# Patient Record
Sex: FEMALE | Race: WHITE | NOT HISPANIC OR LATINO | Employment: FULL TIME | URBAN - METROPOLITAN AREA
[De-identification: names, ages, dates, MRNs, and addresses within clinical notes are randomized per-mention and may not be internally consistent; named-entity substitution may affect disease eponyms.]

---

## 2018-04-04 ENCOUNTER — HOSPITAL ENCOUNTER (EMERGENCY)
Facility: HOSPITAL | Age: 58
Discharge: HOME | End: 2018-04-04
Attending: EMERGENCY MEDICINE | Admitting: EMERGENCY MEDICINE

## 2018-04-04 VITALS
SYSTOLIC BLOOD PRESSURE: 137 MMHG | TEMPERATURE: 98.1 F | WEIGHT: 140 LBS | HEART RATE: 90 BPM | OXYGEN SATURATION: 100 % | DIASTOLIC BLOOD PRESSURE: 83 MMHG | RESPIRATION RATE: 20 BRPM

## 2018-04-04 DIAGNOSIS — M06.9 RHEUMATOID ARTHRITIS FLARE (HCC): Primary | ICD-10-CM

## 2018-04-04 PROCEDURE — 99283 EMERGENCY DEPT VISIT LOW MDM: CPT

## 2018-04-04 RX ORDER — PREDNISONE 1 MG/1
TABLET ORAL
Qty: 21 TABLET | Refills: 0 | Status: SHIPPED | OUTPATIENT
Start: 2018-04-04 | End: 2019-02-26

## 2018-04-04 RX ADMIN — PREDNISONE 50 MG: 20 TABLET ORAL at 13:27

## 2018-04-04 NOTE — ED PROVIDER NOTES
History  Chief Complaint   Patient presents with    Hand Swelling     Pt c/o worsening hand swelling x 2 months  Pt states she was diagnosed RA in knees "years ago"     62-year-old female with past medical history significant for rheumatoid arthritis, aortic aneurysm and ectopic pregnancy presents to the emergency department with chief complaint of bilateral hand swelling  Onset of symptoms reported as 2 months ago  Location of symptoms reported as bilateral hands  Quality is reported as localized swelling and aching pain  Severity is reported as severe  Associated symptoms:  Denies paralysis paresthesias or weaknesses to the upper extremities  Positive for joint swelling  Denies foot pain or shortness of breath  Modifiers: Movement exacerbates pain  Context: denies recent fall, injury or trauma to the area  Patient reports she was diagnosed with rheumatoid arthritis years ago  She reports over the past 2 months she reports regular swelling of the small joints of the bilateral hands  She reports localized redness to the joints of the bilateral hands in addition to swelling  Medical summary:  Review of past visits via [employer] demonstrates no prior visits to this emergency department  History provided by:  Patient   used: No        None       Past Medical History:   Diagnosis Date    Aortic aneurysm ([facility] )     Ectopic pregnancy     RA (rheumatoid arthritis) ([facility] )        History reviewed  No pertinent surgical history  History reviewed  No pertinent family history  I have reviewed and agree with the history as documented  Social History   Substance Use Topics    Smoking status: Never Smoker    Smokeless tobacco: Never Used    Alcohol use No        Review of Systems   Constitutional: Negative for activity change, appetite change, chills, diaphoresis, fatigue and fever     HENT: Negative for congestion, dental problem, drooling, ear discharge, ear pain, facial swelling, hearing loss, mouth sores, nosebleeds, postnasal drip, rhinorrhea, sinus pain, sinus pressure, sneezing, sore throat, tinnitus, trouble swallowing and voice change  Eyes: Negative for photophobia, pain, discharge, redness and itching  Respiratory: Negative for cough, chest tightness, shortness of breath and wheezing  Cardiovascular: Negative for chest pain, palpitations and leg swelling  Gastrointestinal: Negative for abdominal pain, constipation, diarrhea, nausea and vomiting  Endocrine: Negative for cold intolerance, heat intolerance, polydipsia, polyphagia and polyuria  Genitourinary: Negative for decreased urine volume, difficulty urinating, dysuria, flank pain, frequency, hematuria and urgency  Musculoskeletal: Positive for arthralgias and joint swelling  Negative for back pain, gait problem, myalgias, neck pain and neck stiffness  Skin: Positive for rash  Negative for color change, pallor and wound  Allergic/Immunologic: Negative for environmental allergies, food allergies and immunocompromised state  Neurological: Negative for dizziness, tremors, seizures, syncope, facial asymmetry, speech difficulty, weakness, light-headedness, numbness and headaches  Hematological: Negative for adenopathy  Does not bruise/bleed easily  Psychiatric/Behavioral: Negative for agitation, confusion, decreased concentration and hallucinations  The patient is not nervous/anxious  All other systems reviewed and are negative        Physical Exam  ED Triage Vitals [04/04/18 1240]   Temperature Pulse Respirations Blood Pressure SpO2   98 1 °F (36 7 °C) 90 20 137/83 100 %      Temp Source Heart Rate Source Patient Position - Orthostatic VS BP Location FiO2 (%)   Oral Monitor Sitting Left arm --      Pain Score       Worst Possible Pain           Orthostatic Vital Signs  Vitals:    04/04/18 1240   BP: 137/83   Pulse: 90   Patient Position - Orthostatic VS: Sitting       Physical Exam   Constitutional: She is oriented to person, place, and time  She appears well-developed and well-nourished  No distress  /83 (BP Location: Left arm)   Pulse 90   Temp 98 1 °F (36 7 °C) (Oral)   Resp 20   Wt 63 5 kg (140 lb)   SpO2 100%    HENT:   Head: Normocephalic and atraumatic  Right Ear: External ear normal    Left Ear: External ear normal    Nose: Nose normal    Mouth/Throat: Oropharynx is clear and moist  No oropharyngeal exudate  Eyes: Conjunctivae and EOM are normal  Pupils are equal, round, and reactive to light  Right eye exhibits no discharge  Left eye exhibits no discharge  No scleral icterus  Neck: Normal range of motion  Neck supple  No JVD present  No tracheal deviation present  Cardiovascular: Normal rate, regular rhythm and intact distal pulses  Pulmonary/Chest: Effort normal and breath sounds normal  No respiratory distress  She has no wheezes  She has no rales  She exhibits no tenderness  Abdominal: Soft  Bowel sounds are normal  She exhibits no distension and no mass  There is no tenderness  There is no rebound and no guarding  No hernia  Musculoskeletal: Normal range of motion  She exhibits edema and tenderness  She exhibits no deformity  There is redness and swelling present to the MCPJ and PIPJ to all fingers of bilateral hands  Additional swelling to bilateral wrists  Strength 5/5 normal   Cap refill less than 3 seconds  Flex/ext tendon function fully intact to all fingers  Radial pulse 2/4 normal bilaterally  Lymphadenopathy:     She has no cervical adenopathy  Neurological: She is alert and oriented to person, place, and time  She displays normal reflexes  No cranial nerve deficit or sensory deficit  She exhibits normal muscle tone  Coordination normal    Skin: Skin is warm and dry  Capillary refill takes less than 2 seconds  No rash noted  She is not diaphoretic  No erythema  No pallor  Psychiatric: She has a normal mood and affect   Her behavior is normal  Judgment and thought content normal    Nursing note and vitals reviewed  ED Medications  Medications   predniSONE tablet 50 mg (50 mg Oral Given 4/4/18 1327)       Diagnostic Studies  Results Reviewed     None                 No orders to display              Procedures  Procedures       Phone Contacts  ED Phone Contact    ED Course  ED Course                                MDM  Number of Diagnoses or Management Options  Rheumatoid arthritis flare ([facility] ): new and does not require workup  Diagnosis management comments: ddx includes but is not limited to fracture, contusion, sprain, strain, nerve injury, tendon injury, vascular injury, tendinitis, bursitis, dislocation, plan xray to rule out fracture or dislocation  Long discussion with patient this  Discussed will treat with a course of prednisone to alleviate symptoms and for patient Orthopedics or Rheumatology for further evaluation of symptoms  Reviewed reasons to return to ed  Patient verbalized understanding of diagnosis and agreement with discharge plan of care as well as understanding of reasons to return to ed  Amount and/or Complexity of Data Reviewed  Obtain history from someone other than the patient: yes (Family member at bedside)  Review and summarize past medical records: yes    Patient Progress  Patient progress: stable    CritCare Time    Disposition  Final diagnoses:   Rheumatoid arthritis flare ([facility] )     Time reflects when diagnosis was documented in both MDM as applicable and the Disposition within this note     Time User Action Codes Description Comment    4/4/2018  1:19 PM Karlene Skiff Add [M06 9] Rheumatoid arthritis flare [facility])       ED Disposition     ED Disposition Condition Comment    Discharge  [facility] discharge to home/self care      Condition at discharge: Stable        Follow-up Information     Follow up With Specialties Details Why Mckayla Dumont MD Orthopedic Surgery Call in 1 day for further evaluation of symptoms [address]  [facility]      Chang Crowley MD Family Medicine Call in 1 day for further evaluation of symptoms [address] [facility]  [phone]          Discharge Medication List as of 4/4/2018  1:21 PM      START taking these medications    Details   predniSONE 5 mg tablet 40 mg PO day 1, then 30 mg PO day 2, 20 mg PO day 3, 10 mg PO day 4, 5 mg PO day 5 then stop, Print           No discharge procedures on file      ED Provider  Electronically Signed by           Arianne Mattson PA-C  04/04/18 5188

## 2018-04-04 NOTE — DISCHARGE INSTRUCTIONS
Rheumatoid Arthritis   AMBULATORY CARE:   Rheumatoid arthritis  is a long-term autoimmune disease that causes inflammation and damage to your joints  RA causes your body's immune system to attack the synovial membrane (lining) in your joints  RA can also affect other organs, such as your eyes, heart, or lungs  It may also increase your risk of osteoporosis (weakened bones)  Common symptoms include the following:   · Joint pain and stiffness that lasts longer than 1 hour    · Swollen joints in the same joint on both sides of your body    · Loss of joint movement     · Firm, round nodules (growths) on your joints    · Fatigue or muscle weakness    · Loss of appetite or weight loss  Seek care immediately if:   · You have increased joint swelling, pain, or redness  · You have sudden shortness of breath  · You lose feeling in your hands or feet  Contact your healthcare provider or rheumatologist if:   · You have a fever  · Your skin is itchy, swollen, or has a rash  · Your symptoms are getting worse, even with treatment  · You have questions or concerns about your condition or care  Treatment for rheumatoid arthritis  may include any of the following:  · Antirheumatics  help slow the progress of RA, and reduce pain, stiffness, and inflammation  · NSAIDs , such as ibuprofen, help decrease swelling, pain, and fever  This medicine is available with or without a doctor's order  NSAIDs can cause stomach bleeding or kidney problems in certain people  If you take blood thinner medicine, always ask your healthcare provider if NSAIDs are safe for you  Always read the medicine label and follow directions  · Steroids  help decrease inflammation  · Biologic therapy  helps decrease joint swelling, pain, and stiffness  These medicines increase the risk of serious infection and require careful monitoring  · Take your medicine as directed    Contact your healthcare provider if you think your medicine is not helping or if you have side effects  Tell him of her if you are allergic to any medicine  Keep a list of the medicines, vitamins, and herbs you take  Include the amounts, and when and why you take them  Bring the list or the pill bottles to follow-up visits  Carry your medicine list with you in case of an emergency  · Surgery  may be needed to remove all or part of your joint  An implant may be placed to help reduce pain and repair the joint  Manage your symptoms:   · Go to physical and occupational therapy as directed  A physical therapist can teach you exercises to help improve movement and strength, and to decrease pain  An occupational therapist can teach you skills to help with your daily activities  · Use support devices  You may be given splints or braces to help your joints rest and to decrease inflammation  · Rest when your joints are painful  Limit your activities until your symptoms improve  Gradually start your normal activities when you can do them without pain  Avoid motions and activities that cause strain on your joints, such as heavy exercise and lifting  · Apply ice or heat  Both can help decrease swelling and pain  Use an ice pack, or put crushed ice in a plastic bag  Cover it with a towel and place it on your joint for 15 to 20 minutes every hour or as directed  You can apply heat for 20 minutes every 2 hours  Heat treatment includes hot packs or a warm compress  · Maintain a healthy weight  to decrease the strain on joints in your back, knees, ankles, and feet  Ask your healthcare provider how much you should weigh  Ask him to help you create a weight loss plan if you are overweight  · Physical activity  can help increase strength and flexibility  Be as active as possible while avoiding things that increase your pain  Ask your healthcare provider or rheumatologist about the best exercise plan for you  Ask your healthcare provider about vaccines:   You may be at an increased risk for infections due to RA and its treatment  Vaccines may prevent infections from various viruses  Follow up with your healthcare provider as directed:  Write down your questions so you remember to ask them during your visits  © 2017 [employer] Information is for End User's use only and may not be sold, redistributed or otherwise used for commercial purposes  All illustrations and images included in CareNotes® are the copyrighted property of [employer]  or [employer]  The above information is an  only  It is not intended as medical advice for individual conditions or treatments  Talk to your doctor, nurse or pharmacist before following any medical regimen to see if it is safe and effective for you

## 2018-04-30 ENCOUNTER — HOSPITAL ENCOUNTER (EMERGENCY)
Facility: HOSPITAL | Age: 58
Discharge: HOME | End: 2018-05-01
Attending: EMERGENCY MEDICINE

## 2018-04-30 ENCOUNTER — APPOINTMENT (EMERGENCY)
Dept: ULTRASOUND IMAGING | Facility: HOSPITAL | Age: 58
End: 2018-04-30

## 2018-04-30 VITALS
WEIGHT: 140 LBS | OXYGEN SATURATION: 99 % | BODY MASS INDEX: 22.5 KG/M2 | SYSTOLIC BLOOD PRESSURE: 120 MMHG | HEART RATE: 98 BPM | TEMPERATURE: 100.3 F | RESPIRATION RATE: 16 BRPM | HEIGHT: 66 IN | DIASTOLIC BLOOD PRESSURE: 74 MMHG

## 2018-04-30 DIAGNOSIS — I82.890 SUPERFICIAL VEIN THROMBOSIS: Primary | ICD-10-CM

## 2018-04-30 LAB
ANION GAP SERPL CALCULATED.3IONS-SCNC: 9 MMOL/L (ref 4–13)
APTT PPP: 33 SECONDS (ref 23–35)
BACTERIA UR QL AUTO: NORMAL /HPF
BASOPHILS # BLD AUTO: 0.02 THOUSANDS/ΜL (ref 0–0.1)
BASOPHILS NFR BLD AUTO: 0 % (ref 0–1)
BILIRUB UR QL STRIP: NEGATIVE
BUN SERPL-MCNC: 16 MG/DL (ref 5–25)
CALCIUM SERPL-MCNC: 9.1 MG/DL (ref 8.3–10.1)
CHLORIDE SERPL-SCNC: 100 MMOL/L (ref 100–108)
CLARITY UR: CLEAR
CO2 SERPL-SCNC: 27 MMOL/L (ref 21–32)
COLOR UR: YELLOW
CREAT SERPL-MCNC: 0.67 MG/DL (ref 0.6–1.3)
EOSINOPHIL # BLD AUTO: 0.04 THOUSAND/ΜL (ref 0–0.61)
EOSINOPHIL NFR BLD AUTO: 1 % (ref 0–6)
ERYTHROCYTE [DISTWIDTH] IN BLOOD BY AUTOMATED COUNT: 15.7 % (ref 11.6–15.1)
GFR SERPL CREATININE-BSD FRML MDRD: 99 ML/MIN/1.73SQ M
GLUCOSE SERPL-MCNC: 98 MG/DL (ref 65–140)
GLUCOSE UR STRIP-MCNC: NEGATIVE MG/DL
HCT VFR BLD AUTO: 34.7 % (ref 34.8–46.1)
HGB BLD-MCNC: 11.2 G/DL (ref 11.5–15.4)
HGB UR QL STRIP.AUTO: NEGATIVE
INR PPP: 1.05 (ref 0.86–1.16)
KETONES UR STRIP-MCNC: NEGATIVE MG/DL
LACTATE SERPL-SCNC: 0.7 MMOL/L (ref 0.5–2)
LEUKOCYTE ESTERASE UR QL STRIP: NEGATIVE
LYMPHOCYTES # BLD AUTO: 1.28 THOUSANDS/ΜL (ref 0.6–4.47)
LYMPHOCYTES NFR BLD AUTO: 17 % (ref 14–44)
MCH RBC QN AUTO: 25.5 PG (ref 26.8–34.3)
MCHC RBC AUTO-ENTMCNC: 32.3 G/DL (ref 31.4–37.4)
MCV RBC AUTO: 79 FL (ref 82–98)
MONOCYTES # BLD AUTO: 0.73 THOUSAND/ΜL (ref 0.17–1.22)
MONOCYTES NFR BLD AUTO: 10 % (ref 4–12)
NEUTROPHILS # BLD AUTO: 5.5 THOUSANDS/ΜL (ref 1.85–7.62)
NEUTS SEG NFR BLD AUTO: 72 % (ref 43–75)
NITRITE UR QL STRIP: NEGATIVE
NON-SQ EPI CELLS URNS QL MICRO: NORMAL /HPF
NRBC BLD AUTO-RTO: 0 /100 WBCS
PH UR STRIP.AUTO: 6 [PH] (ref 4.5–8)
PLATELET # BLD AUTO: 221 THOUSANDS/UL (ref 149–390)
PMV BLD AUTO: 9.7 FL (ref 8.9–12.7)
POTASSIUM SERPL-SCNC: 3.3 MMOL/L (ref 3.5–5.3)
PROT UR STRIP-MCNC: ABNORMAL MG/DL
PROTHROMBIN TIME: 13.9 SECONDS (ref 12.1–14.4)
RBC # BLD AUTO: 4.39 MILLION/UL (ref 3.81–5.12)
RBC #/AREA URNS AUTO: NORMAL /HPF
SODIUM SERPL-SCNC: 136 MMOL/L (ref 136–145)
SP GR UR STRIP.AUTO: 1.02 (ref 1–1.03)
TROPONIN I SERPL-MCNC: <0.02 NG/ML
UROBILINOGEN UR QL STRIP.AUTO: 0.2 E.U./DL
WBC # BLD AUTO: 7.6 THOUSAND/UL (ref 4.31–10.16)
WBC #/AREA URNS AUTO: NORMAL /HPF

## 2018-04-30 PROCEDURE — 36415 COLL VENOUS BLD VENIPUNCTURE: CPT | Performed by: PHYSICIAN ASSISTANT

## 2018-04-30 PROCEDURE — 96361 HYDRATE IV INFUSION ADD-ON: CPT

## 2018-04-30 PROCEDURE — 93970 EXTREMITY STUDY: CPT | Performed by: SURGERY

## 2018-04-30 PROCEDURE — 87040 BLOOD CULTURE FOR BACTERIA: CPT | Performed by: PHYSICIAN ASSISTANT

## 2018-04-30 PROCEDURE — 96365 THER/PROPH/DIAG IV INF INIT: CPT

## 2018-04-30 PROCEDURE — 85730 THROMBOPLASTIN TIME PARTIAL: CPT | Performed by: PHYSICIAN ASSISTANT

## 2018-04-30 PROCEDURE — 84484 ASSAY OF TROPONIN QUANT: CPT | Performed by: PHYSICIAN ASSISTANT

## 2018-04-30 PROCEDURE — 85610 PROTHROMBIN TIME: CPT | Performed by: PHYSICIAN ASSISTANT

## 2018-04-30 PROCEDURE — 83605 ASSAY OF LACTIC ACID: CPT | Performed by: PHYSICIAN ASSISTANT

## 2018-04-30 PROCEDURE — 85025 COMPLETE CBC W/AUTO DIFF WBC: CPT | Performed by: PHYSICIAN ASSISTANT

## 2018-04-30 PROCEDURE — 93005 ELECTROCARDIOGRAM TRACING: CPT

## 2018-04-30 PROCEDURE — 80048 BASIC METABOLIC PNL TOTAL CA: CPT | Performed by: PHYSICIAN ASSISTANT

## 2018-04-30 PROCEDURE — 81001 URINALYSIS AUTO W/SCOPE: CPT | Performed by: PHYSICIAN ASSISTANT

## 2018-04-30 PROCEDURE — 93970 EXTREMITY STUDY: CPT

## 2018-04-30 RX ORDER — CLINDAMYCIN PHOSPHATE 600 MG/50ML
600 INJECTION INTRAVENOUS ONCE
Status: COMPLETED | OUTPATIENT
Start: 2018-04-30 | End: 2018-04-30

## 2018-04-30 RX ORDER — ACETAMINOPHEN 325 MG/1
650 TABLET ORAL ONCE
Status: COMPLETED | OUTPATIENT
Start: 2018-04-30 | End: 2018-04-30

## 2018-04-30 RX ADMIN — ACETAMINOPHEN 650 MG: 325 TABLET, FILM COATED ORAL at 22:07

## 2018-04-30 RX ADMIN — SODIUM CHLORIDE 1000 ML: 0.9 INJECTION, SOLUTION INTRAVENOUS at 22:05

## 2018-04-30 RX ADMIN — CLINDAMYCIN PHOSPHATE 600 MG: 12 INJECTION, SOLUTION INTRAMUSCULAR; INTRAVENOUS at 22:07

## 2018-04-30 NOTE — Clinical Note
Case was discussed with Abhishek Tobias PA-C and the patient's admission status was agreed to be Admission Status: observation status to the service of Dr Suzie Hernandez

## 2018-05-01 LAB
ATRIAL RATE: 104 BPM
P AXIS: 70 DEGREES
PR INTERVAL: 154 MS
QRS AXIS: 0 DEGREES
QRSD INTERVAL: 92 MS
QT INTERVAL: 346 MS
QTC INTERVAL: 454 MS
T WAVE AXIS: 43 DEGREES
VENTRICULAR RATE: 104 BPM

## 2018-05-01 PROCEDURE — 93010 ELECTROCARDIOGRAM REPORT: CPT | Performed by: INTERNAL MEDICINE

## 2018-05-01 PROCEDURE — 99284 EMERGENCY DEPT VISIT MOD MDM: CPT

## 2018-05-01 RX ADMIN — RIVAROXABAN 10 MG: 10 TABLET, FILM COATED ORAL at 00:02

## 2018-05-01 NOTE — DISCHARGE INSTRUCTIONS
Take xarelto 20 mg tablets - take 1/2 tablet by mouth daily x 45 days  Superficial Thrombophlebitis   WHAT YOU NEED TO KNOW:   Superficial thrombophlebitis (STP) is inflammation of a vein just under your skin (superficial vein)  The inflammation causes a blood clot to form in your vein  STP most often happens in your leg but may also happen in your arm  DISCHARGE INSTRUCTIONS:   Call 911 for any of the following:   · You feel lightheaded, short of breath, and have chest pain  · You cough up blood  Return to the emergency department if:   · Your leg or arm turns pale or blue  · Your leg or arm feels hot or cold  · Your arm or leg feels warm, tender, and painful  It may look swollen and red  Contact your healthcare provider or hematologist if:   · Your symptoms return after treatment  · You have questions or concerns about your condition or care  Medicines: You may  need any of the following:  · Antibiotics  may be given to treat a bacterial infection  · NSAIDs , such as ibuprofen, help decrease swelling, pain, and fever  NSAIDs can cause stomach bleeding or kidney problems in certain people  If you take blood thinner medicine, always ask your healthcare provider if NSAIDs are safe for you  Always read the medicine label and follow directions  · Blood thinners    help prevent blood clots  Examples of blood thinners include heparin and warfarin  Clots can cause strokes, heart attacks, and death  The following are general safety guidelines to follow while you are taking a blood thinner:    ¨ Watch for bleeding and bruising while you take blood thinners  Watch for bleeding from your gums or nose  Watch for blood in your urine and bowel movements  Use a soft washcloth on your skin, and a soft toothbrush to brush your teeth  This can keep your skin and gums from bleeding  If you shave, use an electric shaver  Do not play contact sports       ¨ Tell your dentist and other healthcare providers that you take anticoagulants  Wear a bracelet or necklace that says you take this medicine  ¨ Do not start or stop any medicines unless your healthcare provider tells you to  Many medicines cannot be used with blood thinners  ¨ Tell your healthcare provider right away if you forget to take the medicine, or if you take too much  ¨ Warfarin  is a blood thinner that you may need to take  The following are things you should be aware of if you take warfarin  § Foods and medicines can affect the amount of warfarin in your blood  Do not make major changes to your diet while you take warfarin  Warfarin works best when you eat about the same amount of vitamin K every day  Vitamin K is found in green leafy vegetables and certain other foods  Ask for more information about what to eat when you are taking warfarin  § You will need to see your healthcare provider for follow-up visits when you are on warfarin  You will need regular blood tests  These tests are used to decide how much medicine you need  · Antiplatelets , such as aspirin, help prevent blood clots  Take your antiplatelet medicine exactly as directed  These medicines make it more likely for you to bleed or bruise  If you are told to take aspirin, do not take acetaminophen or ibuprofen instead  · Take your medicine as directed  Contact your healthcare provider if you think your medicine is not helping or if you have side effects  Tell him of her if you are allergic to any medicine  Keep a list of the medicines, vitamins, and herbs you take  Include the amounts, and when and why you take them  Bring the list or the pill bottles to follow-up visits  Carry your medicine list with you in case of an emergency  Follow up with your healthcare provider as directed:  Write down your questions so you remember to ask them during your visits    Manage your symptoms and prevent STP:  STP can increase your risk for a blood clot in deeper veins in your arms or legs  It can also increase your risk for a blood clot in your lungs  Do the following to decrease your risk for more blood clots and manage your symptoms:  · Wear pressure stockings as directed  Pressure stockings improve blood flow and help prevent clots in your legs  Wear the stockings during the day  Do not wear them when you sleep  · Elevate your leg or arm above the level of your heart as often as you can  This will help decrease swelling and pain  Prop your leg or arm on pillows or blankets to keep it elevated comfortably  · Apply a warm compress to your arm or leg  This will help decrease swelling and pain  Wet a washcloth in warm water  Do not  use hot water  Apply the warm compress for 10 minutes  Repeat this 4 times each day  · Maintain a healthy weight  This will help decrease your risk for another blood clot  Ask your healthcare provider how much you should weigh  Ask him or her to help you create a weight loss plan if you are overweight  · Do not smoke  Nicotine and other chemicals in cigarettes and cigars can damage blood vessels and increase your risk for blood clots  Ask your healthcare provider for information if you currently smoke and need help to quit  E-cigarettes or smokeless tobacco still contain nicotine  Talk to your healthcare provider before you use these products  · Stay active  Activity helps prevent blood clots  Do not sit for more than an hour  If you travel by car or work at a desk, move and stretch in your seat several times each hour  In an airplane, get up and walk every hour  Exercise your legs while you are sitting by raising and lowering your heels  Keep your toes on the floor while you do this  You can also raise and lower your toes while keeping your heels on the floor  Also tighten and release your leg muscles while you are sitting  · Exercise regularly  Exercise can help increase your blood flow and prevent a blood clot  Walking is a good low-impact exercise  Talk to your healthcare provider about the best exercise plan for you  · Do not inject illegal drugs  Talk to your healthcare provider if you use IV drugs and need help to quit  © 2017 [employer] Information is for End User's use only and may not be sold, redistributed or otherwise used for commercial purposes  All illustrations and images included in CareNotes® are the copyrighted property of [employer] M , Inc  or [address]  The above information is an  only  It is not intended as medical advice for individual conditions or treatments  Talk to your doctor, nurse or pharmacist before following any medical regimen to see if it is safe and effective for you

## 2018-05-01 NOTE — ED PROVIDER NOTES
History  Chief Complaint   Patient presents with    Leg Swelling     Pt c/o redness, swelling and warmth on right side of leg that began as a single lump and now three lumps      60-year-old female with past medical history significant for aortic aneurysm, rheumatoid arthritis presents to the emergency department with chief complaint of right leg redness and swelling  Onset of symptoms reported as 3 days ago  Location of symptoms reported as the right thigh  Quality is reported as localized redness swelling and warmth  Severity is reported as moderate  Associated symptoms:  Positive for fevers  Positive for rash  Positive for leg pain  Denies paralysis paresthesias or weaknesses to the lower extremities  Denies abdominal pain  Denies chest pain  Modifying factors:  Patient reports nothing has been tried for treatment of symptoms  Context:  Patient reports she initially noticed redness to the right lower thigh 3 days ago  She reports yesterday she noticed redness and swelling approximately senior care up the right thigh  Today she noticed that the redness spread all the way to her groin and became concerned  She denies any recent fall injury or trauma to the area  She reports she does have a 4 6 centimeter aortic aneurysm which is monitored regularly with ultrasound  She reports recently she has been struggling with obtaining medical assistance in order to continue getting her yearly ultrasounds for her aortic aneurysm  Reviewed past visits via [facility] - patient was last seen in ED on 2018 for evaluation and treatment of rheumatoid arthritis  History provided by:  Patient   used: No        Prior to Admission Medications   Prescriptions Last Dose Informant Patient Reported?  Taking?   predniSONE 5 mg tablet   No No   Si mg PO day 1, then 30 mg PO day 2, 20 mg PO day 3, 10 mg PO day 4, 5 mg PO day 5 then stop      Facility-Administered Medications: None       Past Medical History:   Diagnosis Date    Aortic aneurysm (HCC)     Ectopic pregnancy     RA (rheumatoid arthritis) ([facility] [address] )        History reviewed  No pertinent surgical history  History reviewed  No pertinent family history  I have reviewed and agree with the history as documented  Social History   Substance Use Topics    Smoking status: Never Smoker    Smokeless tobacco: Never Used    Alcohol use No        Review of Systems   Constitutional: Positive for fever  Negative for activity change, appetite change, chills, diaphoresis and fatigue  HENT: Negative for congestion, dental problem, drooling, ear discharge, ear pain, facial swelling, hearing loss, mouth sores, nosebleeds, postnasal drip, rhinorrhea, sinus pain, sinus pressure, sneezing, sore throat, tinnitus, trouble swallowing and voice change  Eyes: Negative for photophobia, pain, discharge, redness and itching  Respiratory: Negative for cough, chest tightness, shortness of breath and wheezing  Cardiovascular: Positive for leg swelling  Negative for chest pain and palpitations  Gastrointestinal: Negative for abdominal distention, abdominal pain, anal bleeding, blood in stool, constipation, diarrhea, nausea, rectal pain and vomiting  Endocrine: Negative for cold intolerance, heat intolerance, polydipsia, polyphagia and polyuria  Genitourinary: Negative for decreased urine volume, difficulty urinating, dysuria, flank pain, frequency, hematuria and urgency  Musculoskeletal: Positive for arthralgias  Negative for back pain, gait problem, joint swelling, myalgias, neck pain and neck stiffness  Skin: Positive for rash  Negative for color change, pallor and wound  Allergic/Immunologic: Negative for environmental allergies, food allergies and immunocompromised state  Neurological: Negative for dizziness, tremors, seizures, syncope, facial asymmetry, speech difficulty, weakness, light-headedness, numbness and headaches     Hematological: Negative for adenopathy  Does not bruise/bleed easily  Psychiatric/Behavioral: Negative for agitation, confusion, decreased concentration and hallucinations  The patient is not nervous/anxious  All other systems reviewed and are negative  Physical Exam  ED Triage Vitals   Temperature Pulse Respirations Blood Pressure SpO2   04/30/18 2040 04/30/18 2040 04/30/18 2040 04/30/18 2040 04/30/18 2040   100 3 °F (37 9 °C) (!) 112 18 (!) 190/84 99 %      Temp Source Heart Rate Source Patient Position - Orthostatic VS BP Location FiO2 (%)   04/30/18 2040 04/30/18 2245 04/30/18 2040 04/30/18 2040 --   Oral Monitor Sitting Left arm       Pain Score       04/30/18 2040       7           Orthostatic Vital Signs  Vitals:    04/30/18 2040 04/30/18 2245   BP: (!) 190/84 120/74   Pulse: (!) 112 98   Patient Position - Orthostatic VS: Sitting Lying       Physical Exam   Constitutional: She is oriented to person, place, and time  She appears well-developed and well-nourished  No distress  /84 (BP Location: Left arm)   Pulse 112   Temp 100 3 °F (37 9 °C) (Oral)   Resp 16   Ht 5' 6" (1 676 m)   Wt 63 5 kg (140 lb)   SpO2 99%   BMI 22 60 kg/m²    HENT:   Head: Normocephalic and atraumatic  Right Ear: External ear normal    Left Ear: External ear normal    Nose: Nose normal    Mouth/Throat: Oropharynx is clear and moist  No oropharyngeal exudate  Eyes: Conjunctivae and EOM are normal  Pupils are equal, round, and reactive to light  Right eye exhibits no discharge  Left eye exhibits no discharge  No scleral icterus  Neck: Normal range of motion  Neck supple  No JVD present  No tracheal deviation present  Cardiovascular: Normal rate, regular rhythm and intact distal pulses  Pulmonary/Chest: Effort normal and breath sounds normal  No respiratory distress  She has no wheezes  She exhibits no tenderness  Abdominal: Soft  Bowel sounds are normal  She exhibits no distension and no mass  There is no tenderness   There is no rebound and no guarding  No hernia  Musculoskeletal: Normal range of motion  She exhibits edema and tenderness  She exhibits no deformity  Right upper leg: She exhibits tenderness, swelling and edema  Legs:  There are multiple varicose veins present to bilateral lower extremities  There is an area of erythema present from medial right knee that stretches in a wide band from the medial knee up the medial thigh toward the inguinal area  Area is well demarcated/warm to touch  Lymphadenopathy:     She has no cervical adenopathy  Neurological: She is alert and oriented to person, place, and time  She displays normal reflexes  No cranial nerve deficit or sensory deficit  She exhibits normal muscle tone  Coordination normal    Skin: Skin is warm and dry  Capillary refill takes less than 2 seconds  No rash noted  She is not diaphoretic  There is erythema  No pallor  Psychiatric: She has a normal mood and affect  Her behavior is normal  Judgment and thought content normal    Nursing note and vitals reviewed  ED Medications  Medications   sodium chloride 0 9 % bolus 1,000 mL (0 mL Intravenous Stopped 4/30/18 2317)   clindamycin (CLEOCIN) IVPB (premix) 600 mg (0 mg Intravenous Stopped 4/30/18 2236)   acetaminophen (TYLENOL) tablet 650 mg (650 mg Oral Given 4/30/18 2207)   rivaroxaban (XARELTO) tablet 10 mg (10 mg Oral Given 5/1/18 0002)       Diagnostic Studies  Results Reviewed     Procedure Component Value Units Date/Time    Lactic acid, plasma [[id number]]  (Normal) Collected:  04/30/18 2201    Lab Status:  Final result Specimen:  Blood from Arm, Right Updated:  04/30/18 2228     LACTIC ACID 0 7 mmol/L     Narrative:         Result may be elevated if tourniquet was used during collection      Troponin I [[id number]]  (Normal) Collected:  04/30/18 2201    Lab Status:  Final result Specimen:  Blood from Arm, Right Updated:  04/30/18 2227     Troponin I <0 02 ng/mL     Narrative:         Nu-B-2B Chemistry analyzer 99% cutoff is > 0 04 ng/mL in network labs    o cTnI 99% cutoff is useful only when applied to patients in the clinical setting of myocardial ischemia  o cTnI 99% cutoff should be interpreted in the context of clinical history, ECG findings and possibly cardiac imaging to establish correct diagnosis  o cTnI 99% cutoff may be suggestive but clearly not indicative of a coronary event without the clinical setting of myocardial ischemia  Urine Microscopic [[id number]]  (Normal) Collected:  04/30/18 2200    Lab Status:  Final result Specimen:  Urine from Urine, Clean Catch Updated:  04/30/18 2220     RBC, UA None Seen /hpf      WBC, UA None Seen /hpf      Epithelial Cells Occasional /hpf      Bacteria, UA Occasional /hpf     Basic metabolic panel [[id number]]  (Abnormal) Collected:  04/30/18 2201    Lab Status:  Final result Specimen:  Blood from Arm, Right Updated:  04/30/18 2220     Sodium 136 mmol/L      Potassium 3 3 (L) mmol/L      Chloride 100 mmol/L      CO2 27 mmol/L      Anion Gap 9 mmol/L      BUN 16 mg/dL      Creatinine 0 67 mg/dL      Glucose 98 mg/dL      Calcium 9 1 mg/dL      eGFR 99 ml/min/1 73sq m     Narrative:         National Kidney Disease Education Program recommendations are as follows:  GFR calculation is accurate only with a steady state creatinine  Chronic Kidney disease less than 60 ml/min/1 73 sq  meters  Kidney failure less than 15 ml/min/1 73 sq  meters      Protime-INR [[id number]]  (Normal) Collected:  04/30/18 2201    Lab Status:  Final result Specimen:  Blood from Arm, Right Updated:  04/30/18 2219     Protime 13 9 seconds      INR 1 05    APTT [[id number]]  (Normal) Collected:  04/30/18 2201    Lab Status:  Final result Specimen:  Blood from Arm, Right Updated:  04/30/18 2219     PTT 33 seconds     UA w Reflex to Microscopic w Reflex to Culture [[id number]]  (Abnormal) Collected:  04/30/18 2200    Lab Status:  Final result Specimen:  Urine from Urine, Clean Catch Updated: 04/30/18 2208     Color, UA Yellow     Clarity, UA Clear     Specific [place], UA 1 020     pH, UA 6 0     Leukocytes, UA Negative     Nitrite, UA Negative     Protein,  (2+) (A) mg/dl      Glucose, UA Negative mg/dl      Ketones, UA Negative mg/dl      Urobilinogen, UA 0 2 E U /dl      Bilirubin, UA Negative     Blood, UA Negative    CBC and differential [[id number]]  (Abnormal) Collected:  04/30/18 2201    Lab Status:  Final result Specimen:  Blood from Arm, Right Updated:  04/30/18 2208     WBC 7 60 Thousand/uL      RBC 4 39 Million/uL      Hemoglobin 11 2 (L) g/dL      Hematocrit 34 7 (L) %      MCV 79 (L) fL      MCH 25 5 (L) pg      MCHC 32 3 g/dL      RDW 15 7 (H) %      MPV 9 7 fL      Platelets 508 Thousands/uL      nRBC 0 /100 WBCs      Neutrophils Relative 72 %      Lymphocytes Relative 17 %      Monocytes Relative 10 %      Eosinophils Relative 1 %      Basophils Relative 0 %      Neutrophils Absolute 5 50 Thousands/µL      Lymphocytes Absolute 1 28 Thousands/µL      Monocytes Absolute 0 73 Thousand/µL      Eosinophils Absolute 0 04 Thousand/µL      Basophils Absolute 0 02 Thousands/µL     Blood culture #2 [[id number]] Collected:  04/30/18 2202    Lab Status: In process Specimen:  Blood from Arm, Left Updated:  04/30/18 2205    Blood culture #1 [[id number]] Collected:  04/30/18 2201    Lab Status:   In process Specimen:  Blood from Arm, Right Updated:  04/30/18 2204                 VAS lower limb venous duplex study, complete bilateral   Final Result by Justina Palomo MD (04/30 2438)                 Procedures  ECG 12 Lead Documentation  Date/Time: 4/30/2018 9:54 PM  Performed by: Raffaele Donnelly  Authorized by: Electa Cheadle     Indications / Diagnosis:  Tachycardia  ECG reviewed by me, the ED Provider: yes    Patient location:  ED  Previous ECG:     Previous ECG:  Unavailable    Comparison to cardiac monitor: Yes    Interpretation:     Interpretation: normal    Rate:     ECG rate:  104 ECG rate assessment: tachycardic    Rhythm:     Rhythm: sinus tachycardia    Ectopy:     Ectopy: none    QRS:     QRS axis:  Normal    QRS intervals:  Normal  Conduction:     Conduction: normal    ST segments:     ST segments:  Normal  T waves:     T waves: normal             Phone Contacts  ED Phone Contact    ED Course         HEART Risk Score      Most Recent Value   History  0 Filed at: 05/01/2018 0014   ECG  1 Filed at: 05/01/2018 0014   Age  1 Filed at: 05/01/2018 0014   Risk Factors  1 Filed at: 05/01/2018 0014   Troponin  0 Filed at: 05/01/2018 0014   Heart Score Risk Calculator   History  0 Filed at: 05/01/2018 0014   ECG  1 Filed at: 05/01/2018 0014   Age  1 Filed at: 05/01/2018 0014   Risk Factors  1 Filed at: 05/01/2018 0014   Troponin  0 Filed at: 05/01/2018 0014   HEART Score  3 Filed at: 05/01/2018 0014   HEART Score  3 Filed at: 05/01/2018 0014                    BLANQUITA Risk Score      Most Recent Value   Age >= 72  0 Filed at: 05/01/2018 0015   Known CAD (stenosis >= 50%)  0 Filed at: 05/01/2018 0015   Recent (<=24 hrs) Service Angina  0 Filed at: 05/01/2018 0015   ST Deviation >= 0 5 mm  0 Filed at: 05/01/2018 0015   3+ CAD Risk Factors (FHx, HTN, HLP, DM, Smoker)  0 Filed at: 05/01/2018 0015   Aspirin Use Past 7 Days  0 Filed at: 05/01/2018 0015   Elevated Cardiac Markers  0 Filed at: 05/01/2018 0015   BLANQUITA Risk Score (Calculated)  0 Filed at: 05/01/2018 0015              MDM  Number of Diagnoses or Management Options  Superficial vein thrombosis: new and requires workup  Diagnosis management comments: ddx includes but is not limited to DVT, superficial thrombophlebitis, cellulitis, abscess, plan check ultrasound, check labs, check EKG  Lab results reviewed  CBC remarkable for hemoglobin mildly low at 11 2 and hematocrit mildly low at 34 7  His white blood cell count normal at 7 6  INR normal at 1 0  PTT normal at 33    Basic metabolic panel reviewed remarkable for potassium slightly low at 3 3 otherwise unremarkable  Lactic acid normal at 0 7  Troponin normal at less than 0 02  Urinalysis reviewed and unremarkable  Ultrasound results reviewed  Evidence of superficial thrombophlebitis noted  Occlusive thrombus is  visualized at the saphenofemoral junction extending into the anterior accessory  vein till approximately above the knee where it confluences into the great  saphenous vein and terminating in varicosities in the upper calf  Case discussed with Dr Britney Dumont, vascular surgery regarding case - he reviewed US images via telephone - he recommends anticoagulation based on proximity to the saphenofemoral junction  He reports that patient's aortic aneurysm should not be consideration anticoagulation at this time  I reviewed these results with the patient at bedside  Will start patient on xarelto by mouth tonight  Patient will need 10 mg xarelto daily for 45 days  Patient was given discount card for xarelto which covers 30 days of medication  Instructed patient will write prescription for 30 pills of 20 mg tablets  She was instructed to cut tablets in half and take one half tablet (10 mg) daily for 45 days  She was instructed to follow up with vascular surgery and pcp for recheck  I instructed her should she have any problems with obtaining medication at pharmacy she should return to ED for further treatment and evaluation               Amount and/or Complexity of Data Reviewed  Clinical lab tests: ordered and reviewed  Tests in the radiology section of CPT®: ordered and reviewed  Tests in the medicine section of CPT®: ordered and reviewed  Discussion of test results with the performing providers: yes  Review and summarize past medical records: yes  Discuss the patient with other providers: yes  Independent visualization of images, tracings, or specimens: yes    Patient Progress  Patient progress: stable    CritCare Time    Disposition  Final diagnoses:   Superficial vein thrombosis Time reflects when diagnosis was documented in both MDM as applicable and the Disposition within this note     Time User Action Codes Description Comment    4/30/2018 11:45 PM Orgordon Dale Add [I98 [id number]] Superficial vein thrombosis       ED Disposition     ED Disposition Condition Comment    Discharge          Follow-up Information     Follow up With Specialties Details Why Contact Info Additional [address] MD Family Medicine Call in 1 day for further evaluation of symptoms [address] [facility],[address]  14[address] [facility] Emergency Department Emergency Medicine Go to If symptoms worsen 34 [facility] 96 [place] ED, [address] [place], [place], [address] [facility], MD Vascular Surgery, Radiology Call in 1 day for further evaluation of symptoms [facility]  [phone]           Discharge Medication List as of 5/1/2018 12:00 AM      START taking these medications    Details   rivaroxaban (XARELTO) 20 mg tablet 1/2 tablet PO daily x 45 days  Dx superficial vein thrombus, Print         CONTINUE these medications which have NOT CHANGED    Details   predniSONE 5 mg tablet 40 mg PO day 1, then 30 mg PO day 2, 20 mg PO day 3, 10 mg PO day 4, 5 mg PO day 5 then stop, Print           No discharge procedures on file      ED Provider  Electronically Signed by           Reece Leon PA-C  05/01/18 0325

## 2018-05-06 LAB
BACTERIA BLD CULT: NORMAL
BACTERIA BLD CULT: NORMAL

## 2018-05-11 ENCOUNTER — HOSPITAL ENCOUNTER (EMERGENCY)
Facility: HOSPITAL | Age: 58
Discharge: HOME | End: 2018-05-11
Attending: EMERGENCY MEDICINE | Admitting: EMERGENCY MEDICINE

## 2018-05-11 VITALS
HEART RATE: 92 BPM | TEMPERATURE: 98.3 F | SYSTOLIC BLOOD PRESSURE: 158 MMHG | RESPIRATION RATE: 18 BRPM | OXYGEN SATURATION: 100 % | DIASTOLIC BLOOD PRESSURE: 77 MMHG

## 2018-05-11 DIAGNOSIS — K04.7 DENTAL ABSCESS: Primary | ICD-10-CM

## 2018-05-11 PROCEDURE — 99282 EMERGENCY DEPT VISIT SF MDM: CPT

## 2018-05-11 RX ORDER — CLINDAMYCIN HYDROCHLORIDE 150 MG/1
300 CAPSULE ORAL ONCE
Status: COMPLETED | OUTPATIENT
Start: 2018-05-11 | End: 2018-05-11

## 2018-05-11 RX ORDER — CLINDAMYCIN HYDROCHLORIDE 150 MG/1
300 CAPSULE ORAL EVERY 6 HOURS SCHEDULED
Qty: 56 CAPSULE | Refills: 0 | Status: SHIPPED | OUTPATIENT
Start: 2018-05-11 | End: 2018-05-18

## 2018-05-11 RX ADMIN — CLINDAMYCIN HYDROCHLORIDE 300 MG: 150 CAPSULE ORAL at 14:42

## 2018-05-11 NOTE — ED PROVIDER NOTES
History  Chief Complaint   Patient presents with    Abscess     Pt reports left sided dental pain, reports abscess located on left side  Pt reports hx of dental abscesses  81-year-old female coming in with complaint of left lower dental pain that has been going on for the past 1-2 days  Patient has history of smoking and bad teeth with numerous dental caries and gingivitis  She has had teeth pulled in the past by the dentist but she has not had insurance so has not had dental follow-up in a while  She states she gets insurance on  and is planning follow-up then  She states she gets recurrent gum abscesses that she gets treated with antibiotics and they go away  She has not had any fever, vomiting or diarrhea  History provided by:  Patient  Dental Pain   Location:  Lower  Lower teeth location:  17/LL 3rd molar  Quality:  Aching  Severity:  Moderate  Onset quality:  Gradual  Duration:  2 days  Timing:  Constant  Progression:  Worsening  Chronicity:  Recurrent  Context: poor dentition    Context: not recent dental surgery    Relieved by:  Nothing  Worsened by:  Jaw movement  Ineffective treatments:  Acetaminophen  Associated symptoms: gum swelling    Associated symptoms: no congestion, no difficulty swallowing, no drooling, no facial pain, no facial swelling, no neck pain, no oral lesions and no trismus    Risk factors: lack of dental care (is getting insurance ) and smoking    Risk factors: no diabetes        Prior to Admission Medications   Prescriptions Last Dose Informant Patient Reported? Taking?   predniSONE 5 mg tablet   No No   Si mg PO day 1, then 30 mg PO day 2, 20 mg PO day 3, 10 mg PO day 4, 5 mg PO day 5 then stop   rivaroxaban (XARELTO) 20 mg tablet   No No   Si/2 tablet PO daily x 45 days     Dx superficial vein thrombus   rivaroxaban (XARELTO) 20 mg tablet   No No   Sig: Take 1 tablet (20 mg total) by mouth daily with breakfast      Facility-Administered Medications: None       Past Medical History:   Diagnosis Date    Aortic aneurysm ([facility] [address] )     Ectopic pregnancy     [facility]        History reviewed  No pertinent surgical history  History reviewed  No pertinent family history  I have reviewed and agree with the history as documented  Social History   Substance Use Topics    Smoking status: Never Smoker    Smokeless tobacco: Never Used    Alcohol use No        Review of Systems   HENT: Negative for congestion, drooling, facial swelling and mouth sores  Musculoskeletal: Negative for neck pain  All other systems reviewed and are negative  Physical Exam  ED Triage Vitals [05/11/18 1416]   Temperature Pulse Respirations Blood Pressure SpO2   98 3 °F (36 8 °C) 92 18 158/77 100 %      Temp Source Heart Rate Source Patient Position - Orthostatic VS BP Location FiO2 (%)   Oral Monitor Sitting Left arm --      Pain Score       8           Orthostatic Vital Signs  Vitals:    05/11/18 1416   BP: 158/77   Pulse: 92   Patient Position - Orthostatic VS: Sitting       Physical Exam   Constitutional: She appears well-developed and well-nourished  No distress  HENT:   Head: Normocephalic and atraumatic  Mouth/Throat: Oropharynx is clear and moist  Abnormal dentition  Dental caries present  No uvula swelling  No posterior oropharyngeal edema or posterior oropharyngeal erythema  Eyes: EOM are normal  Pupils are equal, round, and reactive to light  Neck: Neck supple  Cardiovascular: Normal rate and regular rhythm  Pulmonary/Chest: Effort normal and breath sounds normal  No respiratory distress  She has no wheezes  Skin: She is not diaphoretic  Vitals reviewed        ED Medications  Medications   clindamycin (CLEOCIN) capsule 300 mg (not administered)       Diagnostic Studies  Results Reviewed     None                 No orders to display              Procedures  Procedures       Phone Contacts  ED Phone Contact    ED Course                               MDM  Number of Diagnoses or Management Options  Dental abscess: new and does not require workup  Patient Progress  Patient progress: stable    CritCare Time    Disposition  Final diagnoses:   Dental abscess     Time reflects when diagnosis was documented in both MDM as applicable and the Disposition within this note     Time User Action Codes Description Comment    5/11/2018  2:33 PM Sherice PARRY Add [K04 [id number]] Dental abscess       ED Disposition     ED Disposition Condition Comment    Discharge  [facility] discharge to home/self care  Condition at discharge: Good        Follow-up Information     Follow up With Specialties Details Why Contact Info Additional Information    [address] [facility] Emergency Department Emergency Medicine  If symptoms worsen [address] [place] [id number] [phone] [place] ED, [address] [place] [address], [facility]    Call your dentist for follow-up             Patient's Medications   Discharge Prescriptions    CLINDAMYCIN (CLEOCIN) 150 MG CAPSULE    Take 2 capsules (300 mg total) by mouth every 6 (six) hours for 7 days       Start Date: 5/11/2018 End Date: 5/18/2018       Order Dose: 300 mg       Quantity: 56 capsule    Refills: 0     No discharge procedures on file      ED Provider  Electronically Signed by           Philip Seymour MD  05/11/18 8076

## 2018-05-11 NOTE — DISCHARGE INSTRUCTIONS
Dental Abscess   WHAT YOU NEED TO KNOW:   A dental abscess is a collection of pus in or around a tooth  A dental abscess is caused by bacteria  The bacteria usually enter the tooth when the enamel (outer part of the tooth) is damaged by tooth decay  Bacteria may also enter the tooth through a break or chip in the tooth, or a cut in the gum  Food particles that are stuck between the teeth for a long time may also lead to an abscess  DISCHARGE INSTRUCTIONS:   Return to the emergency department if:   · You have severe pain  · You have trouble breathing because of pain or swelling  Contact your healthcare provider if:   · Your symptoms get worse, even after treatment  · Your mouth is bleeding  · You cannot eat or drink because of pain or swelling  · Your abscess returns  · You have an injury that causes a crack in your tooth  · You have questions or concerns about your condition or care  Medicines: You may  need any of the following:  · Antibiotics  help treat a bacterial infection  · NSAIDs , such as ibuprofen, help decrease swelling, pain, and fever  This medicine is available with or without a doctor's order  NSAIDs can cause stomach bleeding or kidney problems in certain people  If you take blood thinner medicine, always ask your healthcare provider if NSAIDs are safe for you  Always read the medicine label and follow directions  · Acetaminophen  decreases pain and fever  It is available without a doctor's order  Ask how much to take and how often to take it  Follow directions  Read the labels of all other medicines you are using to see if they also contain acetaminophen, or ask your doctor or pharmacist  Acetaminophen can cause liver damage if not taken correctly  Do not use more than 4 grams (4,000 milligrams) total of acetaminophen in one day  · Prescription pain medicine  may be given  Ask your healthcare provider how to take this medicine safely   Some prescription pain medicines contain acetaminophen  Do not take other medicines that contain acetaminophen without talking to your healthcare provider  Too much acetaminophen may cause liver damage  Prescription pain medicine may cause constipation  Ask your healthcare provider how to prevent or treat constipation  · Take your medicine as directed  Contact your healthcare provider if you think your medicine is not helping or if you have side effects  Tell him of her if you are allergic to any medicine  Keep a list of the medicines, vitamins, and herbs you take  Include the amounts, and when and why you take them  Bring the list or the pill bottles to follow-up visits  Carry your medicine list with you in case of an emergency  Self-care:   · Rinse your mouth every 2 hours with salt water  This will help keep the area clean  · Gently brush your teeth twice a day with a soft tooth brush  This will help keep the area clean  · Eat soft foods as directed  Soft foods may cause less pain  Examples include applesauce, yogurt, and cooked pasta  Ask your healthcare provider how long to follow this instruction  · Apply a warm compress to your tooth or gum  Use a cotton ball or gauze soaked in warm water  Remove the compress in 10 minutes or when it becomes cool  Repeat 3 times a day  Prevent another abscess:   · Brush your teeth at least 2 times a day with fluoride toothpaste  · Use dental floss to clean between your teeth at least once a day  · Rinse your mouth with water or mouthwash after meals and snacks  · Chew sugarless gum after meals and snacks  · Limit foods that are sticky and high in sugar such as raisons  Also limit drinks high in sugar, such as soda  · See your dentist every 6 months for dental cleanings and oral exams  Follow up with your healthcare provider in 24 hours: Your healthcare provider will need to check your teeth and gums   Write down your questions so you remember to ask them during your visits  © 2017 [employer] Information is for End User's use only and may not be sold, redistributed or otherwise used for commercial purposes  All illustrations and images included in CareNotes® are the copyrighted property of A D A M , Inc  or Blaine Saravia  The above information is an  only  It is not intended as medical advice for individual conditions or treatments  Talk to your doctor, nurse or pharmacist before following any medical regimen to see if it is safe and effective for you

## 2019-02-26 ENCOUNTER — HOSPITAL ENCOUNTER (EMERGENCY)
Facility: HOSPITAL | Age: 59
Discharge: HOME | End: 2019-02-26
Attending: EMERGENCY MEDICINE | Admitting: EMERGENCY MEDICINE
Payer: COMMERCIAL

## 2019-02-26 ENCOUNTER — APPOINTMENT (EMERGENCY)
Dept: ULTRASOUND IMAGING | Facility: HOSPITAL | Age: 59
End: 2019-02-26
Payer: COMMERCIAL

## 2019-02-26 VITALS
HEIGHT: 66 IN | SYSTOLIC BLOOD PRESSURE: 143 MMHG | RESPIRATION RATE: 14 BRPM | WEIGHT: 139.99 LBS | HEART RATE: 60 BPM | BODY MASS INDEX: 22.5 KG/M2 | TEMPERATURE: 98 F | OXYGEN SATURATION: 95 % | DIASTOLIC BLOOD PRESSURE: 69 MMHG

## 2019-02-26 DIAGNOSIS — I80.01 THROMBOPHLEBITIS OF SUPERFICIAL VEINS OF RIGHT LOWER EXTREMITY: Primary | ICD-10-CM

## 2019-02-26 PROCEDURE — 93971 EXTREMITY STUDY: CPT

## 2019-02-26 PROCEDURE — 99283 EMERGENCY DEPT VISIT LOW MDM: CPT

## 2019-02-26 PROCEDURE — 93971 EXTREMITY STUDY: CPT | Performed by: SURGERY

## 2019-02-26 RX ORDER — ATORVASTATIN CALCIUM 10 MG/1
40 TABLET, FILM COATED ORAL DAILY
COMMUNITY

## 2019-02-26 RX ORDER — POTASSIUM CHLORIDE 750 MG/1
20 CAPSULE, EXTENDED RELEASE ORAL DAILY
COMMUNITY

## 2019-02-26 RX ORDER — FUROSEMIDE 20 MG/1
20 TABLET ORAL DAILY
COMMUNITY

## 2019-02-26 RX ORDER — ASPIRIN 325 MG
325 TABLET, DELAYED RELEASE (ENTERIC COATED) ORAL DAILY
Qty: 30 TABLET | Refills: 0 | Status: SHIPPED | OUTPATIENT
Start: 2019-02-26 | End: 2019-03-28

## 2019-02-26 RX ORDER — METOPROLOL SUCCINATE 25 MG/1
25 TABLET, EXTENDED RELEASE ORAL DAILY
COMMUNITY

## 2019-02-26 NOTE — ED PROVIDER NOTES
History  Chief Complaint   Patient presents with    Leg Pain     Patient stated that she has right leg pain  "thinks she has a blood clot again"      81-year-old female presents here with a chief complaint of right leg pain  She reports that the right leg pain started yesterday evening  She reports that she has had blood clots in the past in the same area it feels the same as it did them  No recent surgeries no recent travel or immobility but will perform ultrasound to evaluate her again          Prior to Admission Medications   Prescriptions Last Dose Informant Patient Reported? Taking?   atorvastatin (LIPITOR) 10 mg tablet   Yes Yes   Sig: Take 10 mg by mouth daily   furosemide (LASIX) 20 mg tablet   Yes Yes   Sig: Take 20 mg by mouth daily   metoprolol succinate (TOPROL-XL) 25 mg 24 hr tablet   Yes Yes   Sig: Take 25 mg by mouth daily   potassium chloride (MICRO-K) 10 MEQ CR capsule   Yes Yes   Sig: Take 10 mEq by mouth 2 (two) times a day      Facility-Administered Medications: None       Past Medical History:   Diagnosis Date    Aortic aneurysm (HCC)     Ectopic pregnancy     RA (rheumatoid arthritis) ([facility] [address] )        History reviewed  No pertinent surgical history  History reviewed  No pertinent family history  I have reviewed and agree with the history as documented  Social History     Tobacco Use    Smoking status: Never Smoker    Smokeless tobacco: Never Used   Substance Use Topics    Alcohol use: No    Drug use: No        Review of Systems   Constitutional: Negative for activity change, fatigue and fever  HENT: Negative for congestion, ear pain, rhinorrhea and sore throat  Eyes: Negative  Respiratory: Negative for cough, shortness of breath and wheezing  Gastrointestinal: Negative for abdominal pain, diarrhea, nausea and vomiting  Endocrine: Negative      Genitourinary: Negative for difficulty urinating, dyspareunia, dysuria, flank pain, frequency, menstrual problem, pelvic pain, urgency, vaginal bleeding, vaginal discharge and vaginal pain  Musculoskeletal: Negative for arthralgias and myalgias  Skin: Negative for color change and pallor  Neurological: Negative for dizziness, speech difficulty, weakness and headaches  Hematological: Negative for adenopathy  Psychiatric/Behavioral: Negative for confusion  Physical Exam  Physical Exam   Constitutional: She is oriented to person, place, and time  She appears well-developed and well-nourished  She is cooperative  Non-toxic appearance  She does not have a sickly appearance  She does not appear ill  No distress  HENT:   Head: Normocephalic and atraumatic  Right Ear: Tympanic membrane and external ear normal    Left Ear: Tympanic membrane and external ear normal    Nose: No rhinorrhea, sinus tenderness or nasal deformity  No epistaxis  Right sinus exhibits no maxillary sinus tenderness and no frontal sinus tenderness  Left sinus exhibits no maxillary sinus tenderness and no frontal sinus tenderness  Mouth/Throat: Oropharynx is clear and moist and mucous membranes are normal  Normal dentition  Eyes: Pupils are equal, round, and reactive to light  EOM are normal    Neck: Normal range of motion  Neck supple  Cardiovascular: Normal rate, regular rhythm and normal heart sounds  No murmur heard  Pulmonary/Chest: Effort normal and breath sounds normal  No accessory muscle usage  No respiratory distress  She has no wheezes  She has no rales  She exhibits no tenderness  Abdominal: Soft  She exhibits no distension  There is no guarding  Musculoskeletal: Normal range of motion  She exhibits no edema or tenderness  Right upper leg: She exhibits no swelling and no edema  Legs:  Varicosities of the bilateral lower extremities   Lymphadenopathy:     She has no cervical adenopathy  Neurological: She is alert and oriented to person, place, and time  She exhibits normal muscle tone  Skin: Skin is warm and dry   No rash noted  No erythema  Psychiatric: She has a normal mood and affect  Nursing note and vitals reviewed  Vital Signs  ED Triage Vitals [02/26/19 1020]   Temperature Pulse Respirations Blood Pressure SpO2   98 °F (36 7 °C) 57 14 (!) 203/76 92 %      Temp Source Heart Rate Source Patient Position - Orthostatic VS BP Location FiO2 (%)   Oral Monitor -- -- --      Pain Score       --           Vitals:    02/26/19 1115 02/26/19 1130 02/26/19 1200 02/26/19 1230   BP: 163/79 159/73 139/67 143/69   Pulse:   60        Visual Acuity      ED Medications  Medications - No data to display    Diagnostic Studies  Results Reviewed     None                 VAS lower limb venous duplex study, unilateral/limited    (Results Pending)              Procedures  Procedures       Phone Contacts  ED Phone Contact    ED Course                               MDM  Number of Diagnoses or Management Options  Thrombophlebitis of superficial veins of right lower extremity: new and requires workup  Diagnosis management comments: Thrombophlebitis supportive therapy with compression stockings and aspirin  Amount and/or Complexity of Data Reviewed  Tests in the radiology section of CPT®: reviewed and ordered  Independent visualization of images, tracings, or specimens: yes    Patient Progress  Patient progress: stable      Disposition  Final diagnoses: Thrombophlebitis of superficial veins of right lower extremity     Time reflects when diagnosis was documented in both MDM as applicable and the Disposition within this note     Time User Action Codes Description Comment    2/26/2019 12:22 PM Nam Breanne Add [I80 01] Thrombophlebitis of superficial veins of right lower extremity       ED Disposition     ED Disposition Condition Date/Time Comment    Discharge Stable Tu Feb 26, 2019 12:22 PM Rachele Leo discharge to home/self care              Follow-up Information     Follow up With Specialties Details Why Contact Zaida Monteiro Serena May MD Vascular Surgery, Radiology Schedule an appointment as soon as possible for a visit  For Continued Evaluation [address] [facility] Anmol [address]ar[address] [facility]  [phone]            Discharge Medication List as of 2/26/2019 12:26 PM      START taking these medications    Details   aspirin (ECOTRIN) 325 mg EC tablet Take 1 tablet (325 mg total) by mouth daily for 30 doses, Starting Tue 2/26/2019, Until Thu 3/28/2019, Print         CONTINUE these medications which have NOT CHANGED    Details   atorvastatin (LIPITOR) 10 mg tablet Take 10 mg by mouth daily, Historical Med      furosemide (LASIX) 20 mg tablet Take 20 mg by mouth daily, Historical Med      metoprolol succinate (TOPROL-XL) 25 mg 24 hr tablet Take 25 mg by mouth daily, Historical Med      potassium chloride (MICRO-K) 10 MEQ CR capsule Take 10 mEq by mouth 2 (two) times a day, Historical Med           No discharge procedures on file      ED Provider  Electronically Signed by           Meggan Venegas  02/26/19 0195

## 2020-02-22 ENCOUNTER — APPOINTMENT (EMERGENCY)
Dept: RADIOLOGY | Facility: HOSPITAL | Age: 60
End: 2020-02-22
Payer: COMMERCIAL

## 2020-02-22 ENCOUNTER — APPOINTMENT (EMERGENCY)
Dept: CT IMAGING | Facility: HOSPITAL | Age: 60
End: 2020-02-22
Payer: COMMERCIAL

## 2020-02-22 ENCOUNTER — HOSPITAL ENCOUNTER (OUTPATIENT)
Facility: HOSPITAL | Age: 60
Setting detail: OBSERVATION
Discharge: HOME | End: 2020-02-23
Attending: EMERGENCY MEDICINE | Admitting: STUDENT IN AN ORGANIZED HEALTH CARE EDUCATION/TRAINING PROGRAM
Payer: COMMERCIAL

## 2020-02-22 DIAGNOSIS — F17.200 TOBACCO DEPENDENCY: ICD-10-CM

## 2020-02-22 DIAGNOSIS — J44.1 COPD WITH ACUTE EXACERBATION (HCC): Primary | ICD-10-CM

## 2020-02-22 DIAGNOSIS — J44.1 COPD EXACERBATION (HCC): ICD-10-CM

## 2020-02-22 DIAGNOSIS — R77.8 ELEVATED TROPONIN: ICD-10-CM

## 2020-02-22 PROBLEM — M19.90 INFLAMMATORY ARTHRITIS: Status: ACTIVE | Noted: 2019-10-22

## 2020-02-22 PROBLEM — R00.2 PALPITATIONS: Status: ACTIVE | Noted: 2019-05-14

## 2020-02-22 PROBLEM — G45.3 AMAUROSIS FUGAX OF RIGHT EYE: Status: ACTIVE | Noted: 2019-07-12

## 2020-02-22 PROBLEM — I34.0 MILD MITRAL REGURGITATION: Status: ACTIVE | Noted: 2019-05-17

## 2020-02-22 PROBLEM — G45.9 TIA (TRANSIENT ISCHEMIC ATTACK): Status: ACTIVE | Noted: 2019-05-24

## 2020-02-22 PROBLEM — E23.6 ENLARGED PITUITARY GLAND (HCC): Status: ACTIVE | Noted: 2019-05-24

## 2020-02-22 PROBLEM — E78.5 HYPERLIPIDEMIA: Status: ACTIVE | Noted: 2019-05-14

## 2020-02-22 PROBLEM — R79.89 ELEVATED TROPONIN: Status: ACTIVE | Noted: 2020-02-22

## 2020-02-22 PROBLEM — M05.79 RHEUMATOID ARTHRITIS INVOLVING MULTIPLE SITES WITH POSITIVE RHEUMATOID FACTOR (HCC): Status: ACTIVE | Noted: 2019-05-24

## 2020-02-22 PROBLEM — E87.2 LACTIC ACIDOSIS: Status: ACTIVE | Noted: 2020-02-22

## 2020-02-22 PROBLEM — R63.4 RECENT UNEXPLAINED WEIGHT LOSS: Status: ACTIVE | Noted: 2019-05-17

## 2020-02-22 PROBLEM — I73.9 VASCULAR DISEASE, PERIPHERAL (HCC): Status: ACTIVE | Noted: 2019-05-14

## 2020-02-22 PROBLEM — R09.89 RIGHT CAROTID BRUIT: Status: ACTIVE | Noted: 2018-07-19

## 2020-02-22 PROBLEM — F17.210 CIGARETTE SMOKER: Status: ACTIVE | Noted: 2019-05-17

## 2020-02-22 PROBLEM — D63.8 ANEMIA OF CHRONIC DISEASE: Status: ACTIVE | Noted: 2019-10-22

## 2020-02-22 PROBLEM — R79.89 ABNORMAL TSH: Status: ACTIVE | Noted: 2019-05-24

## 2020-02-22 PROBLEM — I10 HYPERTENSION, ESSENTIAL: Status: ACTIVE | Noted: 2019-05-14

## 2020-02-22 PROBLEM — I71.9 AORTIC ANEURYSM (HCC): Status: ACTIVE | Noted: 2020-02-22

## 2020-02-22 PROBLEM — R05.9 COUGH: Status: ACTIVE | Noted: 2020-02-22

## 2020-02-22 PROBLEM — E87.20 LACTIC ACIDOSIS: Status: ACTIVE | Noted: 2020-02-22

## 2020-02-22 LAB
ALBUMIN SERPL BCP-MCNC: 2.6 G/DL (ref 3.5–5)
ALP SERPL-CCNC: 94 U/L (ref 46–116)
ALT SERPL W P-5'-P-CCNC: 6 U/L (ref 12–78)
ANION GAP SERPL CALCULATED.3IONS-SCNC: 11 MMOL/L (ref 4–13)
APTT PPP: 35 SECONDS (ref 23–37)
AST SERPL W P-5'-P-CCNC: 35 U/L (ref 5–45)
BASOPHILS # BLD AUTO: 0.03 THOUSANDS/ΜL (ref 0–0.1)
BASOPHILS NFR BLD AUTO: 1 % (ref 0–1)
BILIRUB SERPL-MCNC: 0.2 MG/DL (ref 0.2–1)
BUN SERPL-MCNC: 14 MG/DL (ref 5–25)
CALCIUM SERPL-MCNC: 9.2 MG/DL (ref 8.3–10.1)
CHLORIDE SERPL-SCNC: 100 MMOL/L (ref 100–108)
CO2 SERPL-SCNC: 25 MMOL/L (ref 21–32)
CREAT SERPL-MCNC: 0.81 MG/DL (ref 0.6–1.3)
EOSINOPHIL # BLD AUTO: 0.05 THOUSAND/ΜL (ref 0–0.61)
EOSINOPHIL NFR BLD AUTO: 1 % (ref 0–6)
ERYTHROCYTE [DISTWIDTH] IN BLOOD BY AUTOMATED COUNT: 22 % (ref 11.6–15.1)
FLUAV RNA NPH QL NAA+PROBE: NORMAL
FLUBV RNA NPH QL NAA+PROBE: NORMAL
GFR SERPL CREATININE-BSD FRML MDRD: 80 ML/MIN/1.73SQ M
GLUCOSE SERPL-MCNC: 113 MG/DL (ref 65–140)
HCT VFR BLD AUTO: 34.9 % (ref 34.8–46.1)
HGB BLD-MCNC: 10.1 G/DL (ref 11.5–15.4)
IMM GRANULOCYTES # BLD AUTO: 0.04 THOUSAND/UL (ref 0–0.2)
IMM GRANULOCYTES NFR BLD AUTO: 1 % (ref 0–2)
INR PPP: 1.1 (ref 0.84–1.19)
LACTATE SERPL-SCNC: 1.3 MMOL/L (ref 0.5–2)
LACTATE SERPL-SCNC: 1.4 MMOL/L (ref 0.5–2)
LACTATE SERPL-SCNC: 2.3 MMOL/L (ref 0.5–2)
LACTATE SERPL-SCNC: 3.3 MMOL/L (ref 0.5–2)
LYMPHOCYTES # BLD AUTO: 0.98 THOUSANDS/ΜL (ref 0.6–4.47)
LYMPHOCYTES NFR BLD AUTO: 17 % (ref 14–44)
MCH RBC QN AUTO: 21.2 PG (ref 26.8–34.3)
MCHC RBC AUTO-ENTMCNC: 28.9 G/DL (ref 31.4–37.4)
MCV RBC AUTO: 73 FL (ref 82–98)
MONOCYTES # BLD AUTO: 0.54 THOUSAND/ΜL (ref 0.17–1.22)
MONOCYTES NFR BLD AUTO: 9 % (ref 4–12)
NEUTROPHILS # BLD AUTO: 4.17 THOUSANDS/ΜL (ref 1.85–7.62)
NEUTS SEG NFR BLD AUTO: 71 % (ref 43–75)
NRBC BLD AUTO-RTO: 0 /100 WBCS
PLATELET # BLD AUTO: 274 THOUSANDS/UL (ref 149–390)
PLATELET # BLD AUTO: 315 THOUSANDS/UL (ref 149–390)
PMV BLD AUTO: 9.4 FL (ref 8.9–12.7)
PMV BLD AUTO: 9.5 FL (ref 8.9–12.7)
POTASSIUM SERPL-SCNC: 3.7 MMOL/L (ref 3.5–5.3)
PROT SERPL-MCNC: 9 G/DL (ref 6.4–8.2)
PROTHROMBIN TIME: 14.2 SECONDS (ref 11.6–14.5)
RBC # BLD AUTO: 4.77 MILLION/UL (ref 3.81–5.12)
RSV RNA NPH QL NAA+PROBE: NORMAL
SODIUM SERPL-SCNC: 136 MMOL/L (ref 136–145)
TROPONIN I SERPL-MCNC: 0.05 NG/ML
TROPONIN I SERPL-MCNC: 0.06 NG/ML
TROPONIN I SERPL-MCNC: 0.06 NG/ML
WBC # BLD AUTO: 5.81 THOUSAND/UL (ref 4.31–10.16)

## 2020-02-22 PROCEDURE — 94760 N-INVAS EAR/PLS OXIMETRY 1: CPT

## 2020-02-22 PROCEDURE — 83605 ASSAY OF LACTIC ACID: CPT | Performed by: PHYSICIAN ASSISTANT

## 2020-02-22 PROCEDURE — 87040 BLOOD CULTURE FOR BACTERIA: CPT | Performed by: EMERGENCY MEDICINE

## 2020-02-22 PROCEDURE — 36415 COLL VENOUS BLD VENIPUNCTURE: CPT | Performed by: EMERGENCY MEDICINE

## 2020-02-22 PROCEDURE — 85025 COMPLETE CBC W/AUTO DIFF WBC: CPT | Performed by: EMERGENCY MEDICINE

## 2020-02-22 PROCEDURE — 94640 AIRWAY INHALATION TREATMENT: CPT | Performed by: EMERGENCY MEDICINE

## 2020-02-22 PROCEDURE — 71275 CT ANGIOGRAPHY CHEST: CPT

## 2020-02-22 PROCEDURE — 84145 PROCALCITONIN (PCT): CPT | Performed by: EMERGENCY MEDICINE

## 2020-02-22 PROCEDURE — 84484 ASSAY OF TROPONIN QUANT: CPT | Performed by: EMERGENCY MEDICINE

## 2020-02-22 PROCEDURE — 94640 AIRWAY INHALATION TREATMENT: CPT

## 2020-02-22 PROCEDURE — 99220 PR INITIAL OBSERVATION CARE/DAY 70 MINUTES: CPT | Performed by: INTERNAL MEDICINE

## 2020-02-22 PROCEDURE — 87631 RESP VIRUS 3-5 TARGETS: CPT | Performed by: EMERGENCY MEDICINE

## 2020-02-22 PROCEDURE — 85049 AUTOMATED PLATELET COUNT: CPT | Performed by: PHYSICIAN ASSISTANT

## 2020-02-22 PROCEDURE — 85730 THROMBOPLASTIN TIME PARTIAL: CPT | Performed by: EMERGENCY MEDICINE

## 2020-02-22 PROCEDURE — 84484 ASSAY OF TROPONIN QUANT: CPT | Performed by: PHYSICIAN ASSISTANT

## 2020-02-22 PROCEDURE — 96361 HYDRATE IV INFUSION ADD-ON: CPT

## 2020-02-22 PROCEDURE — 85610 PROTHROMBIN TIME: CPT | Performed by: EMERGENCY MEDICINE

## 2020-02-22 PROCEDURE — 84145 PROCALCITONIN (PCT): CPT | Performed by: PHYSICIAN ASSISTANT

## 2020-02-22 PROCEDURE — 99285 EMERGENCY DEPT VISIT HI MDM: CPT | Performed by: EMERGENCY MEDICINE

## 2020-02-22 PROCEDURE — 99285 EMERGENCY DEPT VISIT HI MDM: CPT

## 2020-02-22 PROCEDURE — 71046 X-RAY EXAM CHEST 2 VIEWS: CPT

## 2020-02-22 PROCEDURE — 93005 ELECTROCARDIOGRAM TRACING: CPT

## 2020-02-22 PROCEDURE — 83605 ASSAY OF LACTIC ACID: CPT | Performed by: EMERGENCY MEDICINE

## 2020-02-22 PROCEDURE — 96374 THER/PROPH/DIAG INJ IV PUSH: CPT

## 2020-02-22 PROCEDURE — 80053 COMPREHEN METABOLIC PANEL: CPT | Performed by: EMERGENCY MEDICINE

## 2020-02-22 PROCEDURE — 94664 DEMO&/EVAL PT USE INHALER: CPT

## 2020-02-22 RX ORDER — BENZONATATE 100 MG/1
100 CAPSULE ORAL 3 TIMES DAILY PRN
Status: DISCONTINUED | OUTPATIENT
Start: 2020-02-22 | End: 2020-02-23 | Stop reason: HOSPADM

## 2020-02-22 RX ORDER — ALBUTEROL SULFATE 2.5 MG/3ML
2.5 SOLUTION RESPIRATORY (INHALATION) EVERY 4 HOURS PRN
Status: DISCONTINUED | OUTPATIENT
Start: 2020-02-22 | End: 2020-02-23 | Stop reason: HOSPADM

## 2020-02-22 RX ORDER — ATORVASTATIN CALCIUM 40 MG/1
40 TABLET, FILM COATED ORAL
Status: DISCONTINUED | OUTPATIENT
Start: 2020-02-22 | End: 2020-02-23 | Stop reason: HOSPADM

## 2020-02-22 RX ORDER — ERGOCALCIFEROL (VITAMIN D2) 1250 MCG
50000 CAPSULE ORAL WEEKLY
COMMUNITY

## 2020-02-22 RX ORDER — ACETAMINOPHEN 325 MG/1
650 TABLET ORAL EVERY 4 HOURS PRN
Status: DISCONTINUED | OUTPATIENT
Start: 2020-02-22 | End: 2020-02-22

## 2020-02-22 RX ORDER — LISINOPRIL 20 MG/1
20 TABLET ORAL DAILY
Status: DISCONTINUED | OUTPATIENT
Start: 2020-02-22 | End: 2020-02-23 | Stop reason: HOSPADM

## 2020-02-22 RX ORDER — POTASSIUM CHLORIDE 20 MEQ/1
20 TABLET, EXTENDED RELEASE ORAL DAILY
Status: DISCONTINUED | OUTPATIENT
Start: 2020-02-23 | End: 2020-02-23 | Stop reason: HOSPADM

## 2020-02-22 RX ORDER — DOCUSATE SODIUM 100 MG/1
100 CAPSULE, LIQUID FILLED ORAL 2 TIMES DAILY
Status: DISCONTINUED | OUTPATIENT
Start: 2020-02-22 | End: 2020-02-23 | Stop reason: HOSPADM

## 2020-02-22 RX ORDER — ALBUTEROL SULFATE 90 UG/1
2 AEROSOL, METERED RESPIRATORY (INHALATION) EVERY 6 HOURS PRN
COMMUNITY

## 2020-02-22 RX ORDER — ALBUTEROL SULFATE 2.5 MG/3ML
5 SOLUTION RESPIRATORY (INHALATION) ONCE
Status: COMPLETED | OUTPATIENT
Start: 2020-02-22 | End: 2020-02-22

## 2020-02-22 RX ORDER — ACETAMINOPHEN 325 MG/1
650 TABLET ORAL EVERY 4 HOURS PRN
Status: DISCONTINUED | OUTPATIENT
Start: 2020-02-22 | End: 2020-02-23 | Stop reason: HOSPADM

## 2020-02-22 RX ORDER — FERROUS SULFATE 325(65) MG
325 TABLET ORAL
COMMUNITY

## 2020-02-22 RX ORDER — FUROSEMIDE 20 MG/1
20 TABLET ORAL DAILY
Status: DISCONTINUED | OUTPATIENT
Start: 2020-02-23 | End: 2020-02-23 | Stop reason: HOSPADM

## 2020-02-22 RX ORDER — CLOPIDOGREL BISULFATE 75 MG/1
75 TABLET ORAL DAILY
Status: DISCONTINUED | OUTPATIENT
Start: 2020-02-23 | End: 2020-02-23 | Stop reason: HOSPADM

## 2020-02-22 RX ORDER — PREDNISONE 20 MG/1
40 TABLET ORAL DAILY
Status: DISCONTINUED | OUTPATIENT
Start: 2020-02-23 | End: 2020-02-22

## 2020-02-22 RX ORDER — CLOPIDOGREL BISULFATE 75 MG/1
75 TABLET ORAL DAILY
COMMUNITY

## 2020-02-22 RX ORDER — NICOTINE 21 MG/24HR
1 PATCH, TRANSDERMAL 24 HOURS TRANSDERMAL DAILY
Status: DISCONTINUED | OUTPATIENT
Start: 2020-02-22 | End: 2020-02-23 | Stop reason: HOSPADM

## 2020-02-22 RX ORDER — ONDANSETRON 2 MG/ML
4 INJECTION INTRAMUSCULAR; INTRAVENOUS EVERY 6 HOURS PRN
Status: DISCONTINUED | OUTPATIENT
Start: 2020-02-22 | End: 2020-02-23 | Stop reason: HOSPADM

## 2020-02-22 RX ORDER — METOPROLOL SUCCINATE 25 MG/1
25 TABLET, EXTENDED RELEASE ORAL DAILY
Status: DISCONTINUED | OUTPATIENT
Start: 2020-02-22 | End: 2020-02-23 | Stop reason: HOSPADM

## 2020-02-22 RX ORDER — GUAIFENESIN 600 MG
600 TABLET, EXTENDED RELEASE 12 HR ORAL EVERY 12 HOURS SCHEDULED
Status: DISCONTINUED | OUTPATIENT
Start: 2020-02-22 | End: 2020-02-23 | Stop reason: HOSPADM

## 2020-02-22 RX ORDER — ASPIRIN 325 MG
325 TABLET, DELAYED RELEASE (ENTERIC COATED) ORAL DAILY
Status: DISCONTINUED | OUTPATIENT
Start: 2020-02-22 | End: 2020-02-23 | Stop reason: HOSPADM

## 2020-02-22 RX ORDER — METHYLPREDNISOLONE SODIUM SUCCINATE 40 MG/ML
40 INJECTION, POWDER, LYOPHILIZED, FOR SOLUTION INTRAMUSCULAR; INTRAVENOUS EVERY 12 HOURS SCHEDULED
Status: DISCONTINUED | OUTPATIENT
Start: 2020-02-22 | End: 2020-02-23 | Stop reason: HOSPADM

## 2020-02-22 RX ORDER — BUPROPION HYDROCHLORIDE 150 MG/1
150 TABLET ORAL DAILY
COMMUNITY

## 2020-02-22 RX ORDER — LEVALBUTEROL 1.25 MG/.5ML
1.25 SOLUTION, CONCENTRATE RESPIRATORY (INHALATION)
Status: DISCONTINUED | OUTPATIENT
Start: 2020-02-22 | End: 2020-02-23 | Stop reason: HOSPADM

## 2020-02-22 RX ORDER — BUPROPION HYDROCHLORIDE 150 MG/1
150 TABLET ORAL
Status: DISCONTINUED | OUTPATIENT
Start: 2020-02-22 | End: 2020-02-23 | Stop reason: HOSPADM

## 2020-02-22 RX ORDER — LISINOPRIL 20 MG/1
20 TABLET ORAL DAILY
COMMUNITY

## 2020-02-22 RX ORDER — SODIUM CHLORIDE FOR INHALATION 0.9 %
3 VIAL, NEBULIZER (ML) INHALATION
Status: DISCONTINUED | OUTPATIENT
Start: 2020-02-22 | End: 2020-02-23 | Stop reason: HOSPADM

## 2020-02-22 RX ORDER — ALBUTEROL SULFATE 2.5 MG/3ML
2.5 SOLUTION RESPIRATORY (INHALATION) EVERY 6 HOURS PRN
COMMUNITY

## 2020-02-22 RX ORDER — OXYCODONE HYDROCHLORIDE 5 MG/1
5 TABLET ORAL EVERY 6 HOURS PRN
Status: DISCONTINUED | OUTPATIENT
Start: 2020-02-22 | End: 2020-02-23 | Stop reason: HOSPADM

## 2020-02-22 RX ORDER — SODIUM CHLORIDE 9 MG/ML
75 INJECTION, SOLUTION INTRAVENOUS CONTINUOUS
Status: DISCONTINUED | OUTPATIENT
Start: 2020-02-22 | End: 2020-02-23

## 2020-02-22 RX ORDER — IBUPROFEN 600 MG/1
600 TABLET ORAL EVERY 8 HOURS SCHEDULED
COMMUNITY

## 2020-02-22 RX ORDER — FLUTICASONE PROPIONATE 50 MCG
1 SPRAY, SUSPENSION (ML) NASAL DAILY
Status: DISCONTINUED | OUTPATIENT
Start: 2020-02-22 | End: 2020-02-23 | Stop reason: HOSPADM

## 2020-02-22 RX ORDER — METHYLPREDNISOLONE SODIUM SUCCINATE 125 MG/2ML
125 INJECTION, POWDER, LYOPHILIZED, FOR SOLUTION INTRAMUSCULAR; INTRAVENOUS ONCE
Status: COMPLETED | OUTPATIENT
Start: 2020-02-22 | End: 2020-02-22

## 2020-02-22 RX ADMIN — ISODIUM CHLORIDE 3 ML: 0.03 SOLUTION RESPIRATORY (INHALATION) at 21:07

## 2020-02-22 RX ADMIN — ASPIRIN 325 MG: 325 TABLET, DELAYED RELEASE ORAL at 16:19

## 2020-02-22 RX ADMIN — NICOTINE 1 PATCH: 21 PATCH TRANSDERMAL at 16:15

## 2020-02-22 RX ADMIN — BUPROPION 150 MG: 150 TABLET, EXTENDED RELEASE ORAL at 22:25

## 2020-02-22 RX ADMIN — ALBUTEROL SULFATE 5 MG: 2.5 SOLUTION RESPIRATORY (INHALATION) at 10:47

## 2020-02-22 RX ADMIN — IPRATROPIUM BROMIDE 0.5 MG: 0.5 SOLUTION RESPIRATORY (INHALATION) at 10:47

## 2020-02-22 RX ADMIN — METHYLPREDNISOLONE SODIUM SUCCINATE 125 MG: 125 INJECTION, POWDER, FOR SOLUTION INTRAMUSCULAR; INTRAVENOUS at 10:47

## 2020-02-22 RX ADMIN — FLUTICASONE PROPIONATE 1 SPRAY: 50 SPRAY, METERED NASAL at 16:15

## 2020-02-22 RX ADMIN — LISINOPRIL 20 MG: 20 TABLET ORAL at 16:16

## 2020-02-22 RX ADMIN — IOHEXOL 100 ML: 350 INJECTION, SOLUTION INTRAVENOUS at 12:05

## 2020-02-22 RX ADMIN — METHYLPREDNISOLONE SODIUM SUCCINATE 40 MG: 40 INJECTION, POWDER, FOR SOLUTION INTRAMUSCULAR; INTRAVENOUS at 22:24

## 2020-02-22 RX ADMIN — ATORVASTATIN CALCIUM 40 MG: 40 TABLET, FILM COATED ORAL at 16:16

## 2020-02-22 RX ADMIN — METOPROLOL SUCCINATE 25 MG: 25 TABLET, FILM COATED, EXTENDED RELEASE ORAL at 16:16

## 2020-02-22 RX ADMIN — LEVALBUTEROL HYDROCHLORIDE 1.25 MG: 1.25 SOLUTION, CONCENTRATE RESPIRATORY (INHALATION) at 21:07

## 2020-02-22 RX ADMIN — GUAIFENESIN 600 MG: 600 TABLET ORAL at 22:24

## 2020-02-22 RX ADMIN — ENOXAPARIN SODIUM 40 MG: 40 INJECTION SUBCUTANEOUS at 16:15

## 2020-02-22 RX ADMIN — SODIUM CHLORIDE 75 ML/HR: 0.9 INJECTION, SOLUTION INTRAVENOUS at 16:44

## 2020-02-22 RX ADMIN — SODIUM CHLORIDE 2000 ML: 0.9 INJECTION, SOLUTION INTRAVENOUS at 11:34

## 2020-02-22 NOTE — H&P
H&P- Kinjal Forman 1960, 63 y o  female MRN: [id number]  Unit/Bed#: ED 12 Encounter: [id number]  Primary Care Provider: No primary care provider on file  Date and time admitted to hospital: 2/22/2020 10:23 AM    Elevated troponin  Assessment & Plan  Presents with troponin elevation of 0 05  Denies CP  This is in the setting of COPD exacerbation however patient with moderate risk factors  BLANQUITA score 2  Initial EKG non-ischemic  Trend troponins  Repeat EKG w/ third troponin     * COPD exacerbation (HCC)  Assessment & Plan  · Patient with recent discharge from [address] [place] [address] for COPD exacerbation  Completed Z-Rob and prednisone taper  · Has pulmonologist as an outpatient  · Received methylprednisolone 125 in ED and nebs  · Will continue steroid and nebs  Respiratory protocol admit for COPD exacerbation  · May need longer steroid taper  · Pulmonology consult if no improvement      Cough  Assessment & Plan  Patient with persistent cough since recent discharge  Likely 2/2 COPD exacerbation  CTA PE - no DVT  Low suspicion for PNA - afebrile, check procal  Appears euvolemic  Treat for COPD exacerbation  May need longer prednisone taper  Completed recent Z-Rob  Hypertension, essential  Assessment & Plan  Patient takes Lasix 20 mg, lisinopril 20 mg, and metoprolol succinate 25 mg    Lactic acidosis  Assessment & Plan  Presents with lactic acid 3 2  Setting of COPD exacerbation  Received 2 L bolus in ED  Trend to normal  Will keep on 75 cc/hour IV fluid x12 hours    Aortic aneurysm [facility])  Assessment & Plan  Patient with history of aortic aneurysm repair  CT PE study reviewed  Appears stable  There was small, thrombosed aneurysm in descending thoracic aorta      Mild mitral regurgitation  Assessment & Plan  Patient with history of mild mitral regurg  Follows with [facility] cardiology  Appears Euvolemic    Cigarette smoker  Assessment & Plan  Encourage cessation  Smokes 10 cigarettes a day  Nicotine replacement patch      VTE Prophylaxis: Enoxaparin (Lovenox)  / sequential compression device   Code Status:  Full code  POLST: POLST form is on file already (pre-hospital)  Discussion with family:  Discussed with patient    Anticipated Length of Stay:  Patient will be admitted on an Observation basis with an anticipated length of stay of  less than 2 midnights  Justification for Hospital Stay:  ACS rule out, COPD exacerbation    Total Time for Visit, including Counseling / Coordination of Care: 30 minutes  Greater than 50% of this total time spent on direct patient counseling and coordination of care  Chief Complaint:   Persistent cough    History of Present Illness:    Janelle Salinas is a 63 y o  female past medical history of prior DVT, prior AAA repair , recurrent COPD exacerbation , and mild mitral regurg who presents with persistent cough  Patient recently discharged from [facility] on 02/07 for COPD exacerbation  She was given a Z-Rob and a steroid taper  Since then, she has continued to cough and came to ED for evaluation  She was incidentally found to have elevated troponin  Her vital signs are stable  EKG does not show sign of ischemia  A CTA PE study was done and is negative for PE  She presents with a lactic acidosis and received 2 L fluid bolus in ED  Appropriate for observation for CAD rule out  Also will treat for COPD exacerbation with nebulizer and respiratory protocol and steroid  Patient is a current everyday smoker and we encourage cessation  Pertinent negatives: Patient denies recent travel  Denies sick contacts  She denies chest pain  She denies fevers  No lower extremity swelling  No abdominal pain  No dizziness or lightheadedness  Review of Systems:    Review of Systems   Constitutional: Negative for activity change, appetite change, diaphoresis, fatigue, fever and unexpected weight change  HENT: Positive for congestion and sinus pressure   Negative for dental problem, facial swelling, hearing loss and voice change  Eyes: Negative for pain, discharge, redness, itching and visual disturbance  Respiratory: Positive for cough  Negative for apnea, wheezing and stridor  Cardiovascular: Negative for chest pain, palpitations and leg swelling  Gastrointestinal: Negative for abdominal distention, abdominal pain, anal bleeding, blood in stool, constipation, diarrhea, nausea and vomiting  Genitourinary: Negative for difficulty urinating, dysuria and flank pain  Musculoskeletal: Positive for back pain  Negative for arthralgias, joint swelling and neck stiffness  Neurological: Negative for dizziness, tremors, seizures, syncope, facial asymmetry, speech difficulty, weakness, light-headedness, numbness and headaches  Hematological: Negative for adenopathy  Does not bruise/bleed easily  Psychiatric/Behavioral: Negative for agitation  Past Medical and Surgical History:     Past Medical History:   Diagnosis Date    [facility] ([facility] )     Ectopic pregnancy     RA (rheumatoid arthritis) ([facility] )        Past Surgical History:   Procedure Laterality Date    CARDIAC SURGERY         Meds/Allergies:    Prior to Admission medications    Medication Sig Start Date End Date Taking? Authorizing Provider   aspirin (ECOTRIN) 325 mg EC tablet Take 1 tablet (325 mg total) by mouth daily for 30 doses 2/26/19 3/28/19  AMERICA Katz   atorvastatin (LIPITOR) 10 mg tablet Take 10 mg by mouth daily    Historical Provider, MD   furosemide (LASIX) 20 mg tablet Take 20 mg by mouth daily    Historical Provider, MD   metoprolol succinate (TOPROL-XL) 25 mg 24 hr tablet Take 25 mg by mouth daily    Historical Provider, MD   potassium chloride (MICRO-K) 10 MEQ CR capsule Take 10 mEq by mouth 2 (two) times a day    Historical Provider, MD     I have reviewed home medications with patient family member  Allergies:    Allergies   Allergen Reactions    Penicillins Hives Social History:     Marital Status: Single     Substance Use History:   Social History     Substance and Sexual Activity   Alcohol Use No     Social History     Tobacco Use   Smoking Status Current Every Day Smoker    Packs/day: 0 25    Types: Cigarettes   Smokeless Tobacco Never Used     Social History     Substance and Sexual Activity   Drug Use No       Family History:    non-contributory    Physical Exam:     Vitals:   Blood Pressure: 131/60 (02/22/20 1300)  Pulse: 87 (02/22/20 1300)  Temperature: 98 °F (36 7 °C) (02/22/20 1015)  Temp Source: Oral (02/22/20 1015)  Respirations: 21 (02/22/20 1300)  Height: 5' 5" (165 1 cm) (02/22/20 1015)  Weight - Scale: 49 4 kg (109 lb) (02/22/20 1015)  SpO2: 95 % (02/22/20 1300)    Physical Exam   Constitutional: She is oriented to person, place, and time  She appears well-developed and well-nourished  No distress  Patient is pleasant and fully conversational   She is on room air  No respiratory distress  Nontoxic appearing  HENT:   Head: Normocephalic and atraumatic  Poor dentition   Eyes: Right eye exhibits no discharge  Left eye exhibits no discharge  No scleral icterus  Cardiovascular: Normal rate and regular rhythm  Exam reveals no gallop and no friction rub  No murmur heard  Pulmonary/Chest: Effort normal and breath sounds normal  No respiratory distress  She has no wheezes  She has no rales  Diminished breath sounds   Abdominal: Soft  Bowel sounds are normal  She exhibits no distension  There is no tenderness  Neurological: She is alert and oriented to person, place, and time  Skin: Skin is warm and dry  Nursing note and vitals reviewed  Additional Data:     Lab Results: I have personally reviewed pertinent reports        Results from last 7 days   Lab Units 02/22/20  1041   WBC Thousand/uL 5 81   HEMOGLOBIN g/dL 10 1*   HEMATOCRIT % 34 9   PLATELETS Thousands/uL 315   NEUTROS PCT % 71   LYMPHS PCT % 17   MONOS PCT % 9   EOS PCT % 1 Results from last 7 days   Lab Units 02/22/20  1041   SODIUM mmol/L 136   POTASSIUM mmol/L 3 7   CHLORIDE mmol/L 100   CO2 mmol/L 25   BUN mg/dL 14   CREATININE mg/dL 0 81   ANION GAP mmol/L 11   CALCIUM mg/dL 9 2   ALBUMIN g/dL 2 6*   TOTAL BILIRUBIN mg/dL 0 20   ALK PHOS U/L 94   ALT U/L 6*   AST U/L 35   GLUCOSE RANDOM mg/dL 113     Results from last 7 days   Lab Units 02/22/20  1041   INR  1 10             Results from last 7 days   Lab Units 02/22/20  1243 02/22/20  1041   LACTIC ACID mmol/L 1 3 3 3*       Imaging: I have personally reviewed pertinent reports  CTA ED chest PE Study   Final Result by Bravo Stapleton MD (02/22 1235)      No pulmonary embolus  Linear atelectasis in the left lower lobe with minimal adjacent tree-in-bud nodularity due to bronchiolitis  Mild emphysema  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  Bilateral axillary lymphadenopathy due to rheumatoid arthritis  Workstation performed: [id number]         XR chest pa & lateral   Final Result by Bravo Stapleton MD (02/22 1224)      No acute cardiopulmonary disease  Linear atelectasis in the left lower lobe  Workstation performed: [id number]             EKG, Pathology, and Other Studies Reviewed on Admission:   · EKG: reviewed    Allscripts / Epic Records Reviewed: Yes     ** Please Note: This note has been constructed using a voice recognition system   **

## 2020-02-22 NOTE — ED PROVIDER NOTES
History  Chief Complaint   Patient presents with    Cough     pt states that she has had a cough for a month, was treated at [facility] and treated , states she is still coughing      HPI patient is a 50-year-old female, history of COPD recently treated at a different local hospital with steroids and bronchodilators for diffuse wheezing  Patient reports not improving so she came to our hospital   She complains of some shortness of breath cough and congestion  She reports being somewhat short of breath with exertion  She denies any fever or chills  She reports some sputum production  Past medical history COPD, ectopic pregnancy, rheumatoid arthritis  Social history smoker, denies drug abuse  Family history noncontributory    Prior to Admission Medications   Prescriptions Last Dose Informant Patient Reported? Taking?    albuterol (2 5 mg/3 mL) 0 083 % nebulizer solution  Self Yes Yes   Sig: Take 2 5 mg by nebulization every 6 (six) hours as needed for wheezing or shortness of breath   albuterol (PROVENTIL HFA,VENTOLIN HFA) 90 mcg/act inhaler 2/21/2020 at Unknown time Self Yes Yes   Sig: Inhale 2 puffs every 6 (six) hours as needed for wheezing   aspirin (ECOTRIN) 325 mg EC tablet  Self No No   Sig: Take 1 tablet (325 mg total) by mouth daily for 30 doses   atorvastatin (LIPITOR) 10 mg tablet 2/21/2020 at Unknown time Self Yes Yes   Sig: Take 40 mg by mouth daily    buPROPion (WELLBUTRIN XL) 150 mg 24 hr tablet  Self Yes Yes   Sig: Take 150 mg by mouth daily   clopidogrel (PLAVIX) 75 mg tablet 2/21/2020 at Unknown time Self Yes Yes   Sig: Take 75 mg by mouth daily   ergocalciferol (ERGOCALCIFEROL) 1 25 MG (93766 UT) capsule  Self Yes Yes   Sig: Take 50,000 Units by mouth once a week   ferrous sulfate 325 (65 Fe) mg tablet  Self Yes Yes   Sig: Take 325 mg by mouth daily with breakfast   fluticasone-salmeterol (ADVAIR, WIXELA) 250-50 mcg/dose inhaler  Self Yes Yes   Sig: Inhale 1 puff 2 (two) times a day Rinse mouth after use  furosemide (LASIX) 20 mg tablet   Yes No   Sig: Take 20 mg by mouth daily   ibuprofen (MOTRIN) 600 mg tablet  Self Yes Yes   Sig: Take 600 mg by mouth every 8 (eight) hours   lisinopril (ZESTRIL) 20 mg tablet  Self Yes Yes   Sig: Take 20 mg by mouth daily   metoprolol succinate (TOPROL-XL) 25 mg 24 hr tablet   Yes No   Sig: Take 25 mg by mouth daily   potassium chloride (MICRO-K) 10 MEQ CR capsule 2/21/2020 at Unknown time Self Yes Yes   Sig: Take 20 mEq by mouth daily    tiotropium (SPIRIVA) 18 mcg inhalation capsule  Self Yes Yes   Sig: Place 18 mcg into inhaler and inhale daily      Facility-Administered Medications: None       Past Medical History:   Diagnosis Date    Aortic aneurysm (HCC)     Ectopic pregnancy     [facility]        Past Surgical History:   Procedure Laterality Date    CARDIAC SURGERY         History reviewed  No pertinent family history  I have reviewed and agree with the history as documented  Social History     Tobacco Use    Smoking status: Current Every Day Smoker     Packs/day: 0 25     Types: Cigarettes    Smokeless tobacco: Never Used   Substance Use Topics    Alcohol use: Not Currently    Drug use: No       Review of Systems   Constitutional: Negative for diaphoresis, fatigue and fever  HENT: Positive for congestion  Negative for ear pain, nosebleeds and sore throat  Eyes: Negative for photophobia, pain, discharge and visual disturbance  Respiratory: Positive for cough and wheezing  Negative for choking, chest tightness and shortness of breath  Cardiovascular: Negative for chest pain and palpitations  Gastrointestinal: Negative for abdominal distention, abdominal pain, diarrhea and vomiting  Genitourinary: Negative for dysuria, flank pain and frequency  Musculoskeletal: Negative for back pain, gait problem and joint swelling  Skin: Negative for color change and rash     Neurological: Negative for dizziness, syncope and headaches  Psychiatric/Behavioral: Negative for behavioral problems and confusion  The patient is not nervous/anxious  All other systems reviewed and are negative  Physical Exam  Physical Exam   Constitutional: She is oriented to person, place, and time  She appears well-developed and well-nourished  HENT:   Head: Normocephalic  Right Ear: External ear normal    Left Ear: External ear normal    Nose: Nose normal    Mouth/Throat: Oropharynx is clear and moist    Eyes: Pupils are equal, round, and reactive to light  EOM and lids are normal    Neck: Normal range of motion  Neck supple  Cardiovascular: Normal rate, regular rhythm and normal heart sounds  Pulmonary/Chest: Effort normal  No respiratory distress  She has wheezes  There is mild diffuse wheezing bilaterally, no héctor air hunger no accessory muscle use   Musculoskeletal: Normal range of motion  She exhibits no deformity  Neurological: She is alert and oriented to person, place, and time  Skin: Skin is warm and dry  Psychiatric: She has a normal mood and affect  Nursing note and vitals reviewed    Pulse oximetry normal at 99% adequate oxygenation, there is no hypoxia  Vital Signs  ED Triage Vitals [02/22/20 1015]   Temperature Pulse Respirations Blood Pressure SpO2   98 °F (36 7 °C) 92 17 136/61 99 %      Temp Source Heart Rate Source Patient Position - Orthostatic VS BP Location FiO2 (%)   Oral Monitor Sitting Left arm --      Pain Score       No Pain           Vitals:    02/22/20 1430 02/22/20 1530 02/22/20 1944 02/22/20 2300   BP: 135/64 137/65 111/74 114/54   Pulse: 102 85 74 97   Patient Position - Orthostatic VS:  Lying Lying Lying         Visual Acuity      ED Medications  Medications   aspirin (ECOTRIN) EC tablet 325 mg (325 mg Oral Given 2/22/20 1619)   clopidogrel (PLAVIX) tablet 75 mg (has no administration in time range)   atorvastatin (LIPITOR) tablet 40 mg (40 mg Oral Given 2/22/20 1616)   furosemide (LASIX) tablet 20 mg (has no administration in time range)   metoprolol succinate (TOPROL-XL) 24 hr tablet 25 mg (25 mg Oral Given 2/22/20 1616)   buPROPion (WELLBUTRIN XL) 24 hr tablet 150 mg (150 mg Oral Given 2/22/20 2225)   lisinopril (ZESTRIL) tablet 20 mg (20 mg Oral Given 2/22/20 1616)   potassium chloride (K-DUR,KLOR-CON) CR tablet 20 mEq (has no administration in time range)   docusate sodium (COLACE) capsule 100 mg (100 mg Oral Refused 2/22/20 1758)   ondansetron (ZOFRAN) injection 4 mg (has no administration in time range)   nicotine (NICODERM CQ) 21 mg/24 hr TD 24 hr patch 1 patch (1 patch Transdermal Medication Applied 2/22/20 1615)   methylPREDNISolone sodium succinate (Solu-MEDROL) injection 40 mg (40 mg Intravenous Given 2/22/20 2224)   enoxaparin (LOVENOX) subcutaneous injection 40 mg (40 mg Subcutaneous Given 2/22/20 1615)   acetaminophen (TYLENOL) tablet 650 mg (has no administration in time range)   sodium chloride 0 9 % infusion (75 mL/hr Intravenous New Bag 2/22/20 1644)   albuterol inhalation solution 2 5 mg (has no administration in time range)   benzonatate (TESSALON PERLES) capsule 100 mg (has no administration in time range)   guaiFENesin (MUCINEX) 12 hr tablet 600 mg (600 mg Oral Given 2/22/20 2224)   fluticasone (FLONASE) 50 mcg/act nasal spray 1 spray (1 spray Each Nare Given 2/22/20 1615)   oxyCODONE (ROXICODONE) IR tablet 5 mg (has no administration in time range)   levalbuterol (XOPENEX) inhalation solution 1 25 mg (1 25 mg Nebulization Given 2/22/20 2107)   sodium chloride 0 9 % inhalation solution 3 mL (3 mL Nebulization Given 2/22/20 2107)   HYDROcodone-homatropine (HYCODAN) 5-1 5 mg/5 mL oral syrup 5 mL (has no administration in time range)   albuterol inhalation solution 5 mg (5 mg Nebulization Given 2/22/20 1047)   ipratropium (ATROVENT) 0 02 % inhalation solution 0 5 mg (0 5 mg Nebulization Given 2/22/20 1047)   methylPREDNISolone sodium succinate (Solu-MEDROL) injection 125 mg (125 mg Intravenous Given 2/22/20 1047)   sodium chloride 0 9 % bolus 2,000 mL (0 mL Intravenous Stopped 2/22/20 1416)   iohexol (OMNIPAQUE) 350 MG/ML injection (MULTI-DOSE) 100 mL (100 mL Intravenous Given 2/22/20 1205)       Diagnostic Studies  Results Reviewed     Procedure Component Value Units Date/Time    Lactic acid x2 [[id number]]  (Normal) Collected:  02/22/20 1243    Lab Status:  Final result Specimen:  Blood from Arm, Left Updated:  02/22/20 1318     LACTIC ACID 1 3 mmol/L     Narrative:       Result may be elevated if tourniquet was used during collection  Influenza A/B and RSV PCR [[id number]]  (Normal) Collected:  02/22/20 1041    Lab Status:  Final result Specimen:  Nasopharyngeal Swab Updated:  02/22/20 1130     INFLUENZA A PCR None Detected     INFLUENZA B PCR None Detected     RSV PCR None Detected    Lactic acid x2 [[id number]]  (Abnormal) Collected:  02/22/20 1041    Lab Status:  Final result Specimen:  Blood from Arm, Right Updated:  02/22/20 1128     LACTIC ACID 3 3 mmol/L     Narrative:       Result may be elevated if tourniquet was used during collection      Comprehensive metabolic panel [[id number]]  (Abnormal) Collected:  02/22/20 1041    Lab Status:  Final result Specimen:  Blood from Arm, Right Updated:  02/22/20 1119     Sodium 136 mmol/L      Potassium 3 7 mmol/L      Chloride 100 mmol/L      CO2 25 mmol/L      ANION GAP 11 mmol/L      BUN 14 mg/dL      Creatinine 0 81 mg/dL      Glucose 113 mg/dL      Calcium 9 2 mg/dL      AST 35 U/L      ALT 6 U/L      Alkaline Phosphatase 94 U/L      Total Protein 9 0 g/dL      Albumin 2 6 g/dL      Total Bilirubin 0 20 mg/dL      eGFR 80 ml/min/1 73sq m     Narrative:       [place] guidelines for Chronic Kidney Disease (CKD):     Stage 1 with normal or high GFR (GFR > 90 mL/min/1 73 square meters)    Stage 2 Mild CKD (GFR = 60-89 mL/min/1 73 square meters)    Stage 3A Moderate CKD (GFR = 45-59 mL/min/1 73 square meters)   Stage 3B Moderate CKD (GFR = 30-44 mL/min/1 73 square meters)    Stage 4 Severe CKD (GFR = 15-29 mL/min/1 73 square meters)    Stage 5 End Stage CKD (GFR <15 mL/min/1 73 square meters)  Note: GFR calculation is accurate only with a steady state creatinine    Blood culture #1 [[id number]] Collected:  02/22/20 1110    Lab Status: In process Specimen:  Blood from Arm, Left Updated:  02/22/20 1113    Troponin I [[id number]]  (Abnormal) Collected:  02/22/20 1041    Lab Status:  Final result Specimen:  Blood from Arm, Right Updated:  02/22/20 1108     Troponin I 0 05 ng/mL     Protime-INR [[id number]]  (Normal) Collected:  02/22/20 1041    Lab Status:  Final result Specimen:  Blood from Arm, Right Updated:  02/22/20 1104     Protime 14 2 seconds      INR 1 10    APTT [[id number]]  (Normal) Collected:  02/22/20 1041    Lab Status:  Final result Specimen:  Blood from Arm, Right Updated:  02/22/20 1104     PTT 35 seconds     CBC and differential [[id number]]  (Abnormal) Collected:  02/22/20 1041    Lab Status:  Final result Specimen:  Blood from Arm, Right Updated:  02/22/20 1050     WBC 5 81 Thousand/uL      RBC 4 77 Million/uL      Hemoglobin 10 1 g/dL      Hematocrit 34 9 %      MCV 73 fL      MCH 21 2 pg      MCHC 28 9 g/dL      RDW 22 0 %      MPV 9 4 fL      Platelets 274 Thousands/uL      nRBC 0 /100 WBCs      Neutrophils Relative 71 %      Immat GRANS % 1 %      Lymphocytes Relative 17 %      Monocytes Relative 9 %      Eosinophils Relative 1 %      Basophils Relative 1 %      Neutrophils Absolute 4 17 Thousands/µL      Immature Grans Absolute 0 04 Thousand/uL      Lymphocytes Absolute 0 98 Thousands/µL      Monocytes Absolute 0 54 Thousand/µL      Eosinophils Absolute 0 05 Thousand/µL      Basophils Absolute 0 03 Thousands/µL     Procalcitonin [[id number]] Collected:  02/22/20 1041    Lab Status:   In process Specimen:  Blood from Arm, Right Updated:  02/22/20 1045    Blood culture #2 [[id number]] Collected:  02/22/20 1041    Lab Status: In process Specimen:  Blood from Arm, Right Updated:  02/22/20 1045                 CTA ED chest PE Study   Final Result by Frank Sanches MD (02/22 1235)      No pulmonary embolus  Linear atelectasis in the left lower lobe with minimal adjacent tree-in-bud nodularity due to bronchiolitis  Mild emphysema  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  Bilateral axillary lymphadenopathy due to rheumatoid arthritis  Workstation performed: [id number]         XR chest pa & lateral   Final Result by Frank Sanches MD (02/22 1224)      No acute cardiopulmonary disease  Linear atelectasis in the left lower lobe  Workstation performed: [id number]                    Procedures  ECG 12 Lead Documentation Only  Date/Time: 2/22/2020 9:31 PM  Performed by: Raven Mcelroy MD  Authorized by: Raven Mcelroy MD     Indications / Diagnosis:   cough congestion  ECG reviewed by me, the ED Provider: yes    Patient location:  ED  Previous ECG:     Previous ECG:  Unavailable  Interpretation:     Interpretation: non-specific    Rate:     ECG rate:   89    ECG rate assessment: normal    Rhythm:     Rhythm: sinus rhythm    Comments:       Normal sinus rhythm with fascicular block, no acute ST-T wave changes             ED Course    diagnostic testing, lactate was initially elevated at 3 3, improved with IV fluids  Influenza testing was negative  patient's electrolytes were within normal limits, normal BUN creatinine no sign of renal dysfunction  white count was normal at 5 8 no sign of inflammation, hemoglobin was low at 10 consistent with chronic anemia  Cardiac troponin was positive  Because the patient had a positive cardiac troponin I was concerned for pulmonary embolism, she had a normal EKG CT was done to rule out pulmonary embolism it showed linear atelectasis in the left lower lobe consistent with bronchiolitis            Initial Sepsis Screening     Row Name 02/22/20 6935                Is the patient's history suggestive of a new or worsening infection?         Suspected source of infection  pneumonia  -PF        Are two or more of the following signs & symptoms of infection both present and new to the patient? No  -PF        Indicate SIRS criteria          If the answer is yes to both questions, suspicion of sepsis is present          If severe sepsis is present AND tissue hypoperfusion perists in the hour after fluid resuscitation or lactate > 4, the patient meets criteria for SEPTIC SHOCK          Are any of the following organ dysfunction criteria present within 6 hours of suspected infection and SIRS criteria that are NOT considered to be chronic conditions?         Organ dysfunction          Date of presentation of severe sepsis          Time of presentation of severe sepsis          Tissue hypoperfusion persists in the hour after crystalloid fluid administration, evidenced, by either:          Was hypotension present within one hour of the conclusion of crystalloid fluid administration?           Date of presentation of septic shock          Time of presentation of septic shock            User Key  (r) = Recorded By, (t) = Taken By, (c) = Cosigned By    [address] Name Provider Type    PF Jaquelin Horner MD Physician            Yareli Le' Criteria for PE      Most Recent Value   Wells' Criteria for PE   Clinical signs and symptoms of DVT  0 Filed at: 02/22/2020 1134   PE is primary diagnosis or equally likely  3 Filed at: 02/22/2020 1134   HR >100  0 Filed at: 02/22/2020 1134   Immobilization at least 3 days or Surgery in the previous 4 weeks  0 Filed at: 02/22/2020 1134   Previous, objectively diagnosed PE or DVT  0 Filed at: 02/22/2020 1134   Hemoptysis  0 Filed at: 02/22/2020 1134   Malignancy with treatment within 6 months or palliative  0 Filed at: 02/22/2020 1134   Wells' Criteria Total  3 Filed at: 02/22/2020 1134 MDM medical decision making 61-year-old female, worsening COPD over the last several weeks, treated at another local hospital with outpatient oral antibiotics and steroids without relief  Presents to emergency department with cough and wheezing, EKG showed no acute changes patient had a positive cardiac troponin  CT was done to rule out pulmonary embolism was negative  Patient had some linear atelectasis  Presentation consistent with exacerbation COPD  No pneumonia present  Patient initially high lactate possibly due today agree hydration and poor perfusion, she improved with IV fluids  Discussed hospitalization with hospitalist service, discussed with patient,      Disposition  Final diagnoses:   COPD with acute exacerbation ([facility] )   Elevated troponin     Time reflects when diagnosis was documented in both MDM as applicable and the Disposition within this note     Time User Action Codes Description Comment    2/22/2020 12:58 PM Bautista Spotted [J44 1] [facility]     2/22/2020 12:59 PM Catie Maria Add [R79 89] Elevated troponin       ED Disposition     ED Disposition Condition Date/Time Comment    Admit Stable Sat Feb 22, 2020 12:58 PM ase was discussed with the hospitalist service patient will be a observation status to the service of [facility]          Follow-up Information    None         Current Discharge Medication List      CONTINUE these medications which have NOT CHANGED    Details   albuterol (2 5 mg/3 mL) 0 083 % nebulizer solution Take 2 5 mg by nebulization every 6 (six) hours as needed for wheezing or shortness of breath      albuterol (PROVENTIL HFA,VENTOLIN HFA) 90 mcg/act inhaler Inhale 2 puffs every 6 (six) hours as needed for wheezing    Comments: Substitution to a formulary equivalent within the same pharmaceutical class is authorized        atorvastatin (LIPITOR) 10 mg tablet Take 40 mg by mouth daily       buPROPion (WELLBUTRIN XL) 150 mg 24 hr tablet Take 150 mg by mouth daily      clopidogrel (PLAVIX) 75 mg tablet Take 75 mg by mouth daily      ergocalciferol (ERGOCALCIFEROL) 1 25 MG (68870 UT) capsule Take 50,000 Units by mouth once a week      ferrous sulfate 325 (65 Fe) mg tablet Take 325 mg by mouth daily with breakfast      fluticasone-salmeterol (ADVAIR, WIXELA) 250-50 mcg/dose inhaler Inhale 1 puff 2 (two) times a day Rinse mouth after use  Comments: Substitution to a formulary equivalent within the same pharmaceutical class is authorized  ibuprofen (MOTRIN) 600 mg tablet Take 600 mg by mouth every 8 (eight) hours      lisinopril (ZESTRIL) 20 mg tablet Take 20 mg by mouth daily      potassium chloride (MICRO-K) 10 MEQ CR capsule Take 20 mEq by mouth daily       tiotropium (SPIRIVA) 18 mcg inhalation capsule Place 18 mcg into inhaler and inhale daily      aspirin (ECOTRIN) 325 mg EC tablet Take 1 tablet (325 mg total) by mouth daily for 30 doses  Qty: 30 tablet, Refills: 0    Associated Diagnoses: Thrombophlebitis of superficial veins of right lower extremity      furosemide (LASIX) 20 mg tablet Take 20 mg by mouth daily      metoprolol succinate (TOPROL-XL) 25 mg 24 hr tablet Take 25 mg by mouth daily           No discharge procedures on file      PDMP Review     None          ED Provider  Electronically Signed by           Linda Souza MD  02/22/20 4628

## 2020-02-22 NOTE — ASSESSMENT & PLAN NOTE
Patient with history of mild mitral regurg  Follows with [facility] cardiology  Appears Euvolemic

## 2020-02-22 NOTE — ASSESSMENT & PLAN NOTE
Presents with lactic acid 3 2  Setting of COPD exacerbation    Received 2 L bolus in ED  Trend to normal  Will keep on 75 cc/hour IV fluid x12 hours

## 2020-02-22 NOTE — ASSESSMENT & PLAN NOTE
Presents with troponin elevation of 0 05  Denies CP  This is in the setting of COPD exacerbation however patient with moderate risk factors    BLANQUITA score 2  Initial EKG non-ischemic  Trend troponins  Repeat EKG w/ third troponin   Tele monitor

## 2020-02-22 NOTE — ASSESSMENT & PLAN NOTE
Patient with history of aortic aneurysm repair  CT PE study reviewed  Appears stable  There was small, thrombosed aneurysm in descending thoracic aorta

## 2020-02-22 NOTE — RESPIRATORY THERAPY NOTE
RT Protocol Note  Elsie Escobedo 63 y o  female MRN: [id number]  Unit/Bed#: -01 Encounter: [id number]    Assessment    Principal Problem:    COPD exacerbation ([place] [address] )  Active Problems:    Cigarette smoker    Hypertension, essential    Mild mitral regurgitation    Aortic aneurysm (HCC)    Elevated troponin    Lactic acidosis    Cough      Home Pulmonary Medications:  Advair, Spiriva, albuterol prn       Past Medical History:   Diagnosis Date    Aortic aneurysm (HCC)     Ectopic pregnancy     RA (rheumatoid arthritis) ([place] [address] )      Social History     Socioeconomic History    Marital status: Single     Spouse name: None    Number of children: None    Years of education: None    Highest education level: None   Occupational History    None   Social Needs    Financial resource strain: None    Food insecurity:     Worry: None     Inability: None    Transportation needs:     Medical: None     Non-medical: None   Tobacco Use    Smoking status: Current Every Day Smoker     Packs/day: 0 25     Types: Cigarettes    Smokeless tobacco: Never Used   Substance and Sexual Activity    Alcohol use: No    Drug use: No    Sexual activity: None   Lifestyle    Physical activity:     Days per week: None     Minutes per session: None    Stress: None   Relationships    Social connections:     Talks on phone: None     Gets together: None     Attends Jew service: None     Active member of club or organization: None     Attends meetings of clubs or organizations: None     Relationship status: None    Intimate partner violence:     Fear of current or ex partner: None     Emotionally abused: None     Physically abused: None     Forced sexual activity: None   Other Topics Concern    None   Social History Narrative    None       Subjective         Objective    Physical Exam:   Assessment Type: Assess only  General Appearance: Alert, Awake  Respiratory Pattern: Normal  Chest Assessment: Chest expansion symmetrical  Bilateral Breath Sounds: Clear, Diminished    Vitals:  Blood pressure 137/65, pulse 85, temperature 97 5 °F (36 4 °C), temperature source Oral, resp  rate 18, height 5' 5" (1 651 m), weight 49 7 kg (109 lb 9 1 oz), SpO2 99 %  Imaging and other studies: I have personally reviewed pertinent reports  Plan    Respiratory Plan: No distress/Pulmonary history     Patient admitted for copd  Spo2 on room air is 99%  Breath sounds are clear, decreased bilaterally  Patient has a strong, non-productive cough and can clear and protect her airway  TID nebs have been ordered  No further modalitites unless patient condition changes

## 2020-02-22 NOTE — ASSESSMENT & PLAN NOTE
· Patient with recent discharge from [facility] for COPD exacerbation  Completed Z-Rob and prednisone taper  · Has pulmonologist as an outpatient  · Received methylprednisolone 125 in ED and nebs  · Will continue steroid and nebs    Respiratory protocol admit for COPD exacerbation  · May need longer steroid taper  · Pulmonology consult if no improvement

## 2020-02-22 NOTE — ASSESSMENT & PLAN NOTE
Patient with persistent cough since recent discharge  Likely 2/2 COPD exacerbation  CTA PE - no DVT  Low suspicion for PNA - afebrile, check procal  Appears euvolemic  Treat for COPD exacerbation  May need longer prednisone taper  Completed recent Z-Rob

## 2020-02-23 VITALS
SYSTOLIC BLOOD PRESSURE: 144 MMHG | HEART RATE: 86 BPM | OXYGEN SATURATION: 100 % | HEIGHT: 65 IN | TEMPERATURE: 97.6 F | DIASTOLIC BLOOD PRESSURE: 67 MMHG | RESPIRATION RATE: 20 BRPM | WEIGHT: 111.33 LBS | BODY MASS INDEX: 18.55 KG/M2

## 2020-02-23 LAB
ANION GAP SERPL CALCULATED.3IONS-SCNC: 10 MMOL/L (ref 4–13)
ATRIAL RATE: 89 BPM
BUN SERPL-MCNC: 19 MG/DL (ref 5–25)
CALCIUM SERPL-MCNC: 8.6 MG/DL (ref 8.3–10.1)
CHLORIDE SERPL-SCNC: 102 MMOL/L (ref 100–108)
CHOLEST SERPL-MCNC: 164 MG/DL (ref 50–200)
CO2 SERPL-SCNC: 23 MMOL/L (ref 21–32)
CREAT SERPL-MCNC: 0.8 MG/DL (ref 0.6–1.3)
ERYTHROCYTE [DISTWIDTH] IN BLOOD BY AUTOMATED COUNT: 22.1 % (ref 11.6–15.1)
GFR SERPL CREATININE-BSD FRML MDRD: 82 ML/MIN/1.73SQ M
GLUCOSE SERPL-MCNC: 154 MG/DL (ref 65–140)
HCT VFR BLD AUTO: 31.5 % (ref 34.8–46.1)
HDLC SERPL-MCNC: 32 MG/DL
HGB BLD-MCNC: 8.9 G/DL (ref 11.5–15.4)
LACTATE SERPL-SCNC: 1.9 MMOL/L (ref 0.5–2)
LDLC SERPL CALC-MCNC: 113 MG/DL (ref 0–100)
MAGNESIUM SERPL-MCNC: 1.5 MG/DL (ref 1.6–2.6)
MCH RBC QN AUTO: 21 PG (ref 26.8–34.3)
MCHC RBC AUTO-ENTMCNC: 28.3 G/DL (ref 31.4–37.4)
MCV RBC AUTO: 75 FL (ref 82–98)
NONHDLC SERPL-MCNC: 132 MG/DL
P AXIS: 63 DEGREES
PHOSPHATE SERPL-MCNC: 3.2 MG/DL (ref 2.7–4.5)
PLATELET # BLD AUTO: 308 THOUSANDS/UL (ref 149–390)
PMV BLD AUTO: 10 FL (ref 8.9–12.7)
POTASSIUM SERPL-SCNC: 3.7 MMOL/L (ref 3.5–5.3)
PR INTERVAL: 174 MS
PROCALCITONIN SERPL-MCNC: 0.06 NG/ML
PROCALCITONIN SERPL-MCNC: <0.05 NG/ML
PROCALCITONIN SERPL-MCNC: <0.05 NG/ML
QRS AXIS: -68 DEGREES
QRSD INTERVAL: 114 MS
QT INTERVAL: 380 MS
QTC INTERVAL: 462 MS
RBC # BLD AUTO: 4.23 MILLION/UL (ref 3.81–5.12)
SODIUM SERPL-SCNC: 135 MMOL/L (ref 136–145)
T WAVE AXIS: 68 DEGREES
TRIGL SERPL-MCNC: 96 MG/DL
TROPONIN I SERPL-MCNC: 0.05 NG/ML
VENTRICULAR RATE: 89 BPM
WBC # BLD AUTO: 5.01 THOUSAND/UL (ref 4.31–10.16)

## 2020-02-23 PROCEDURE — 83735 ASSAY OF MAGNESIUM: CPT | Performed by: PHYSICIAN ASSISTANT

## 2020-02-23 PROCEDURE — 80061 LIPID PANEL: CPT | Performed by: PHYSICIAN ASSISTANT

## 2020-02-23 PROCEDURE — 93010 ELECTROCARDIOGRAM REPORT: CPT | Performed by: INTERNAL MEDICINE

## 2020-02-23 PROCEDURE — 84484 ASSAY OF TROPONIN QUANT: CPT | Performed by: PHYSICIAN ASSISTANT

## 2020-02-23 PROCEDURE — 84100 ASSAY OF PHOSPHORUS: CPT | Performed by: PHYSICIAN ASSISTANT

## 2020-02-23 PROCEDURE — 84145 PROCALCITONIN (PCT): CPT | Performed by: PHYSICIAN ASSISTANT

## 2020-02-23 PROCEDURE — 85027 COMPLETE CBC AUTOMATED: CPT | Performed by: PHYSICIAN ASSISTANT

## 2020-02-23 PROCEDURE — 94760 N-INVAS EAR/PLS OXIMETRY 1: CPT

## 2020-02-23 PROCEDURE — 94640 AIRWAY INHALATION TREATMENT: CPT

## 2020-02-23 PROCEDURE — 83605 ASSAY OF LACTIC ACID: CPT | Performed by: STUDENT IN AN ORGANIZED HEALTH CARE EDUCATION/TRAINING PROGRAM

## 2020-02-23 PROCEDURE — 80048 BASIC METABOLIC PNL TOTAL CA: CPT | Performed by: PHYSICIAN ASSISTANT

## 2020-02-23 RX ORDER — GUAIFENESIN 600 MG
600 TABLET, EXTENDED RELEASE 12 HR ORAL EVERY 12 HOURS SCHEDULED
Qty: 10 TABLET | Refills: 0 | Status: SHIPPED | OUTPATIENT
Start: 2020-02-23 | End: 2020-02-23

## 2020-02-23 RX ORDER — NICOTINE 21 MG/24HR
1 PATCH, TRANSDERMAL 24 HOURS TRANSDERMAL DAILY
Qty: 28 PATCH | Refills: 0 | Status: SHIPPED | OUTPATIENT
Start: 2020-02-24

## 2020-02-23 RX ORDER — NICOTINE 21 MG/24HR
1 PATCH, TRANSDERMAL 24 HOURS TRANSDERMAL DAILY
Qty: 28 PATCH | Refills: 0 | Status: SHIPPED | OUTPATIENT
Start: 2020-02-24 | End: 2020-02-23

## 2020-02-23 RX ORDER — PREDNISONE 10 MG/1
TABLET ORAL
Qty: 20 TABLET | Refills: 0 | Status: SHIPPED | OUTPATIENT
Start: 2020-02-23

## 2020-02-23 RX ORDER — GUAIFENESIN 600 MG
600 TABLET, EXTENDED RELEASE 12 HR ORAL EVERY 12 HOURS SCHEDULED
Qty: 10 TABLET | Refills: 0 | Status: SHIPPED | OUTPATIENT
Start: 2020-02-23

## 2020-02-23 RX ADMIN — ISODIUM CHLORIDE 3 ML: 0.03 SOLUTION RESPIRATORY (INHALATION) at 08:41

## 2020-02-23 RX ADMIN — POTASSIUM CHLORIDE 20 MEQ: 1500 TABLET, EXTENDED RELEASE ORAL at 08:51

## 2020-02-23 RX ADMIN — METOPROLOL SUCCINATE 25 MG: 25 TABLET, FILM COATED, EXTENDED RELEASE ORAL at 08:51

## 2020-02-23 RX ADMIN — LISINOPRIL 20 MG: 20 TABLET ORAL at 08:51

## 2020-02-23 RX ADMIN — NICOTINE 1 PATCH: 21 PATCH TRANSDERMAL at 09:01

## 2020-02-23 RX ADMIN — DOCUSATE SODIUM 100 MG: 100 CAPSULE, LIQUID FILLED ORAL at 08:51

## 2020-02-23 RX ADMIN — ENOXAPARIN SODIUM 40 MG: 40 INJECTION SUBCUTANEOUS at 08:50

## 2020-02-23 RX ADMIN — HYDROCODONE BITARTRATE AND HOMATROPINE METHYLBROMIDE 5 ML: 5; 1.5 SOLUTION ORAL at 10:09

## 2020-02-23 RX ADMIN — CLOPIDOGREL BISULFATE 75 MG: 75 TABLET ORAL at 08:51

## 2020-02-23 RX ADMIN — BENZONATATE 100 MG: 100 CAPSULE ORAL at 10:09

## 2020-02-23 RX ADMIN — GUAIFENESIN 600 MG: 600 TABLET ORAL at 08:51

## 2020-02-23 RX ADMIN — LEVALBUTEROL HYDROCHLORIDE 1.25 MG: 1.25 SOLUTION, CONCENTRATE RESPIRATORY (INHALATION) at 08:41

## 2020-02-23 RX ADMIN — ASPIRIN 325 MG: 325 TABLET, DELAYED RELEASE ORAL at 08:51

## 2020-02-23 RX ADMIN — FUROSEMIDE 20 MG: 20 TABLET ORAL at 08:51

## 2020-02-23 RX ADMIN — METHYLPREDNISOLONE SODIUM SUCCINATE 40 MG: 40 INJECTION, POWDER, FOR SOLUTION INTRAMUSCULAR; INTRAVENOUS at 08:51

## 2020-02-23 NOTE — UTILIZATION REVIEW
Initial Clinical Review    Admission: Date/Time/Statement: Admission Orders (From admission, onward)     Ordered        02/22/20 1259  Place in Observation  Once                   Orders Placed This Encounter   Procedures    Place in Observation     Standing Status:   Standing     Number of Occurrences:   1     Order Specific Question:   Admitting Physician     Answer:   Sam Zhao     Order Specific Question:   Level of Care     Answer:   Med Surg [16]     ED Arrival Information     Expected Arrival Acuity Means of Arrival Escorted By Service Admission Type    - 2/22/2020 09:44 Urgent Walk-In Self General Medicine Urgent    Arrival Complaint    Cough        Chief Complaint   Patient presents with    Cough     pt states that she has had a cough for a month, was treated at [facility] and treated , states she is still coughing      Assessment/Plan:   63 y o  female to ED presents with persistent cough  PMH of prior DVT, prior AAA repair , recurrent COPD exacerbation , and mild mitral regurg  Recent discharged from [facility] on 02/07 for COPD exacerbation on Z-Rob and steroid taper  She was incidentally found to have elevated troponin  EKG does not show sign of ischemia  CTA PE study was done and is negative for PE  Pt with a lactic acidosis and received 2 L fluid bolus in ED  She smokes cigarette  Diminished breath sounds  Admit Observation level of care Elevated troponin, COPD exacerbation, Cough, Lactic acidosis  Troponin 0 05  Trend troponin and repeat EKG w/ third troponin  Completed Z-Rob and prednisone taper  IV steriods and nebs given in ED and continue  Pulmonology consult if no improvement  IVF 2L bolus given in ED  Trend lactic acid  IVFs       ED Triage Vitals [02/22/20 1015]   Temperature Pulse Respirations Blood Pressure SpO2   98 °F (36 7 °C) 92 17 136/61 99 %      Temp Source Heart Rate Source Patient Position - Orthostatic VS BP Location FiO2 (%)   Oral Monitor Sitting Left arm -- Pain Score       No Pain        Wt Readings from Last 1 Encounters:   02/23/20 50 5 kg (111 lb 5 3 oz)     Additional Vital Signs:   02/23/20 0809  97 3 °F (36 3 °C)Abnormal   77  20  135/65    97 %  None (Room air)  Lying   02/23/20 0300  97 5 °F (36 4 °C)  86  18  141/68    98 %  None (Room air)  Lying   02/22/20 2300  98 °F (36 7 °C)  97  19  114/54    98 %  None (Room air)  Lying   02/22/20 2107            98 %  None (Room air)     02/22/20 1944  97 7 °F (36 5 °C)  74  18  111/74    94 %  None (Room air)  Lying   02/22/20 1630            99 %  None (Room air)       Pertinent Labs/Diagnostic Test Results:   2/22 CXR - No acute cardiopulmonary disease  Linear atelectasis in the left lower lobe  2/22 CTA ED chest PE Study - No pulmonary embolus  Linear atelectasis in the left lower lobe with minimal adjacent tree-in-bud nodularity due to bronchiolitis  Mild emphysema  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  Bilateral axillary lymphadenopathy due to rheumatoid arthritis      Results from last 7 days   Lab Units 02/23/20  0432 02/22/20  1652 02/22/20  1041   WBC Thousand/uL 5 01  --  5 81   HEMOGLOBIN g/dL 8 9*  --  10 1*   HEMATOCRIT % 31 5*  --  34 9   PLATELETS Thousands/uL 308 274 315   NEUTROS ABS Thousands/µL  --   --  4 17         Results from last 7 days   Lab Units 02/23/20  0432 02/22/20  1041   SODIUM mmol/L 135* 136   POTASSIUM mmol/L 3 7 3 7   CHLORIDE mmol/L 102 100   CO2 mmol/L 23 25   ANION GAP mmol/L 10 11   BUN mg/dL 19 14   CREATININE mg/dL 0 80 0 81   EGFR ml/min/1 73sq m 82 80   CALCIUM mg/dL 8 6 9 2   MAGNESIUM mg/dL 1 5*  --    PHOSPHORUS mg/dL 3 2  --      Results from last 7 days   Lab Units 02/22/20  1041   AST U/L 35   ALT U/L 6*   ALK PHOS U/L 94   TOTAL PROTEIN g/dL 9 0*   ALBUMIN g/dL 2 6*   TOTAL BILIRUBIN mg/dL 0 20         Results from last 7 days   Lab Units 02/23/20  0432 02/22/20  1041   GLUCOSE RANDOM mg/dL 154* 113 Results from last 7 days   Lab Units 02/23/20  0650 02/22/20  2102 02/22/20  1652 02/22/20  1041   TROPONIN I ng/mL 0 05* 0 06* 0 06* 0 05*         Results from last 7 days   Lab Units 02/22/20  1041   PROTIME seconds 14 2   INR  1 10   PTT seconds 35         Results from last 7 days   Lab Units 02/22/20  1652 02/22/20  1041   PROCALCITONIN ng/ml <0 05 0 06     Results from last 7 days   Lab Units 02/23/20  0650 02/22/20  2102 02/22/20  1652 02/22/20  1243 02/22/20  1041   LACTIC ACID mmol/L 1 9 2 3* 1 4 1 3 3 3*     Results from last 7 days   Lab Units 02/22/20  1041   INFLUENZA A PCR  None Detected   INFLUENZA B PCR  None Detected   RSV PCR  None Detected     Results from last 7 days   Lab Units 02/22/20  1110 02/22/20  1041   BLOOD CULTURE  Received in Microbiology Lab  Culture in Progress  Received in Microbiology Lab  Culture in Progress         ED Treatment:   Medication Administration from 02/22/2020 0944 to 02/22/2020 1516       Date/Time Order Dose Route Action     02/22/2020 1047 albuterol inhalation solution 5 mg 5 mg Nebulization Given     02/22/2020 1047 ipratropium (ATROVENT) 0 02 % inhalation solution 0 5 mg 0 5 mg Nebulization Given     02/22/2020 1047 methylPREDNISolone sodium succinate (Solu-MEDROL) injection 125 mg 125 mg Intravenous Given     02/22/2020 1134 sodium chloride 0 9 % bolus 2,000 mL 2,000 mL Intravenous New Bag     02/22/2020 1205 iohexol (OMNIPAQUE) 350 MG/ML injection (MULTI-DOSE) 100 mL 100 mL Intravenous Given        Past Medical History:   Diagnosis Date    Aortic aneurysm (HCC)     Ectopic pregnancy     RA (rheumatoid arthritis) ([facility] )      Present on Admission:   Mild mitral regurgitation   Hypertension, essential   Cigarette smoker    Admitting Diagnosis: Cough [R05]  Elevated troponin [R79 89]  COPD with acute exacerbation ([facility] ) [J44 1]  Age/Sex: 63 y o  female     Admission Orders:  Bld culture x2  Tele monitoring    Scheduled Medications:  Medications:  aspirin 325 mg Oral Daily   atorvastatin 40 mg Oral Daily With Dinner   buPROPion 150 mg Oral HS   clopidogrel 75 mg Oral Daily   docusate sodium 100 mg Oral BID   enoxaparin 40 mg Subcutaneous Daily   fluticasone 1 spray Each Nare Daily   furosemide 20 mg Oral Daily   guaiFENesin 600 mg Oral Q12H [address]   levalbuterol 1 25 mg Nebulization TID   lisinopril 20 mg Oral Daily   methylPREDNISolone sodium succinate 40 mg Intravenous Q12H [address]   metoprolol succinate 25 mg Oral Daily   nicotine 1 patch Transdermal Daily   potassium chloride 20 mEq Oral Daily   sodium chloride 3 mL Nebulization TID     Continuous IV Infusions:    sodium chloride 0 9 % infusion   Rate: 75 mL/hr Dose: 75 mL/hr  Freq: Continuous Route: IV  Indications of Use: IV Hydration  Last Dose: Stopped (02/23/20 0553)  Start: 02/22/20 1530 End: 02/23/20 0443    PRN Meds:  acetaminophen 650 mg Oral Q4H PRN   albuterol 2 5 mg Nebulization Q4H PRN   benzonatate 100 mg Oral TID PRN   HYDROcodone-homatropine 5 mL Oral Q4H PRN  2/23 x1   ondansetron 4 mg Intravenous Q6H PRN   oxyCODONE 5 mg Oral Q6H PRN     Network Utilization Review Department  [email or web address]  ATTENTION: Please call with any questions or concerns to [phone] and carefully listen to the prompts so that you are directed to the right person  All voicemails are confidential   Gamaliel Watts all requests for admission clinical reviews, approved or denied determinations and any other requests to dedicated fax number below belonging to the campus where the patient is receiving treatment   List of dedicated fax numbers for the Facilities:  FACILITY NAME UR FAX NUMBER   ADMISSION DENIALS (Administrative/Medical Necessity) [phone]   [address] (Maternity/NICU/Pediatrics) [phone]   [facility] [phone]   [facility] [address] [address] [facility] [address] [phone]   [facility]  [phone]   [address] [facility]  [phone] [address] [phone]

## 2020-02-23 NOTE — SEPSIS NOTE
Sepsis Note   Jamal Burks 63 y o  female MRN: [id number]  Unit/Bed#: -01 Encounter: [id number]      qSOFA     Row Name 02/22/20 2300 02/22/20 1944 02/22/20 1530 02/22/20 1430 02/22/20 1300    Altered mental status GCS < 15              Respiratory Rate > / =22  0  0  0  0  0    Systolic BP < / =774  0  0  0  0  0    Q Sofa Score  0  0  0  0  0    Row Name 02/22/20 1240 02/22/20 1015             Altered mental status GCS < 15           Respiratory Rate > / =22  0  0       Systolic BP < / =571  0  0       Q Sofa Score  0  0           Initial Sepsis Screening     Row Name 02/22/20 5097                Is the patient's history suggestive of a new or worsening infection?         Suspected source of infection  pneumonia  -PF        Are two or more of the following signs & symptoms of infection both present and new to the patient? No  -PF        Indicate SIRS criteria          If the answer is yes to both questions, suspicion of sepsis is present          If severe sepsis is present AND tissue hypoperfusion perists in the hour after fluid resuscitation or lactate > 4, the patient meets criteria for SEPTIC SHOCK          Are any of the following organ dysfunction criteria present within 6 hours of suspected infection and SIRS criteria that are NOT considered to be chronic conditions?         Organ dysfunction          Date of presentation of severe sepsis          Time of presentation of severe sepsis          Tissue hypoperfusion persists in the hour after crystalloid fluid administration, evidenced, by either:          Was hypotension present within one hour of the conclusion of crystalloid fluid administration?           Date of presentation of septic shock          Time of presentation of septic shock            User Key  (r) = Recorded By, (t) = Taken By, (c) = Cosigned By    [address] Name Provider Type    PF Ольга Torres MD Physician          Patient presents with pneumonia, initial lactate was elevated,, treated with IV antibiotics, treated with fluids, repeat lactate was normal   No sign of septic shock

## 2020-02-25 NOTE — UTILIZATION REVIEW
Notification of Discharge  This is a Notification of Discharge from our facility [address]  Please be advised that this patient has been discharge from our facility  Below you will find the admission and discharge date and time including the patients disposition  PRESENTATION DATE: 2/22/2020 10:23 AM  OBS ADMISSION DATE:   IP ADMISSION DATE: N/A N/A   DISCHARGE DATE: 2/23/2020  1:31 PM  DISPOSITION: Home/Self Care Home/Self Care   Admission Orders listed below:  Admission Orders (From admission, onward)     Ordered        02/22/20 1259  Place in Observation  Once                   Please contact the UR Department if additional information is required to close this patient's authorization/case  [address] [facility] Utilization Review Department  Main: [phone] x carefully listen to the prompts  All voicemails are confidential   [email or web address]  Send all requests for admission clinical reviews, approved or denied determinations and any other requests to dedicated fax number below belonging to the campus where the patient is receiving treatment   List of dedicated fax numbers:  [address] DENIALS (Administrative/Medical Necessity) [phone]   [address] (Maternity/NICU/Pediatrics) [phone]   [facility] [phone]   [facility] [phone]   [facility] [phone]   Anders Buerger [address] [phone]   [facility]  [phone]   [address] [facility] [address]  [address] [address] [facility] [phone]

## 2020-02-28 LAB
BACTERIA BLD CULT: NORMAL
BACTERIA BLD CULT: NORMAL

## 2020-04-16 ENCOUNTER — HOSPITAL ENCOUNTER (EMERGENCY)
Facility: HOSPITAL | Age: 60
Discharge: HOME | End: 2020-04-16
Attending: EMERGENCY MEDICINE | Admitting: EMERGENCY MEDICINE
Payer: COMMERCIAL

## 2020-04-16 VITALS
HEART RATE: 87 BPM | SYSTOLIC BLOOD PRESSURE: 139 MMHG | RESPIRATION RATE: 16 BRPM | TEMPERATURE: 97.2 F | OXYGEN SATURATION: 96 % | BODY MASS INDEX: 18.64 KG/M2 | DIASTOLIC BLOOD PRESSURE: 60 MMHG | WEIGHT: 111.99 LBS

## 2020-04-16 DIAGNOSIS — M10.9 GOUT: Primary | ICD-10-CM

## 2020-04-16 PROCEDURE — 99284 EMERGENCY DEPT VISIT MOD MDM: CPT | Performed by: PHYSICIAN ASSISTANT

## 2020-04-16 PROCEDURE — 99283 EMERGENCY DEPT VISIT LOW MDM: CPT

## 2020-04-16 RX ORDER — COLCHICINE 0.6 MG/1
1.2 TABLET ORAL ONCE
Status: COMPLETED | OUTPATIENT
Start: 2020-04-16 | End: 2020-04-16

## 2020-04-16 RX ADMIN — COLCHICINE 1.2 MG: 0.6 TABLET, FILM COATED ORAL at 10:25

## 2024-02-21 PROBLEM — R05.9 COUGH: Status: RESOLVED | Noted: 2020-02-22 | Resolved: 2024-02-21

## 2024-09-23 ENCOUNTER — TELEPHONE (OUTPATIENT)
Dept: NEPHROLOGY | Facility: CLINIC | Age: 64
End: 2024-09-23

## 2024-09-23 NOTE — TELEPHONE ENCOUNTER
Called spoke with patient advised we do not par with [facility]. per dayana, MR. patient verbalized understanding and is okay with it. appointment has been cancelled for 10/09/24 @ 10:30am with Dr.Adeem Godfrey.

## 2025-03-12 ENCOUNTER — TELEPHONE (OUTPATIENT)
Age: 65
End: 2025-03-12

## 2025-03-12 DIAGNOSIS — N18.30 STAGE 3 CHRONIC KIDNEY DISEASE, UNSPECIFIED WHETHER STAGE 3A OR 3B CKD (HCC): Primary | ICD-10-CM

## 2025-03-12 NOTE — TELEPHONE ENCOUNTER
Patient booked a new patient appointment due to protein in urine results.    She has CKD Stage II and is on medication for her kidneys.    She was told to see a nephrologist asa but next available is not until July. She has been placed on wait list.    She is calling her PCP to see about a STAT referral but is asking if there is any way she can be seen sooner?

## 2025-03-12 NOTE — TELEPHONE ENCOUNTER
Called and advised that as of now we have no appts available. Advised that pt will be called as soon as new appt slot will open. Pt understood and was ok with that.

## 2025-03-18 ENCOUNTER — TELEPHONE (OUTPATIENT)
Age: 65
End: 2025-03-18

## 2025-03-18 NOTE — TELEPHONE ENCOUNTER
Patient called asking if we could fax her lab orders to [facility] in [facility] on [address] in [place].  Patient stated she is going tomorrow for her labs.  I tried calling for the fax number but they close at 3pm.  Phone is [phone]

## 2025-03-21 ENCOUNTER — CONSULT (OUTPATIENT)
Dept: NEPHROLOGY | Facility: CLINIC | Age: 65
End: 2025-03-21
Payer: MEDICARE

## 2025-03-21 ENCOUNTER — PREP FOR PROCEDURE (OUTPATIENT)
Dept: INTERVENTIONAL RADIOLOGY/VASCULAR | Facility: CLINIC | Age: 65
End: 2025-03-21

## 2025-03-21 VITALS
HEIGHT: 63 IN | HEART RATE: 90 BPM | OXYGEN SATURATION: 96 % | RESPIRATION RATE: 16 BRPM | DIASTOLIC BLOOD PRESSURE: 80 MMHG | BODY MASS INDEX: 18.78 KG/M2 | WEIGHT: 106 LBS | SYSTOLIC BLOOD PRESSURE: 140 MMHG | TEMPERATURE: 97.2 F

## 2025-03-21 DIAGNOSIS — M19.90 INFLAMMATORY ARTHRITIS: ICD-10-CM

## 2025-03-21 DIAGNOSIS — I71.20 THORACIC AORTIC ANEURYSM WITHOUT RUPTURE, UNSPECIFIED PART (HCC): ICD-10-CM

## 2025-03-21 DIAGNOSIS — N18.2 CKD (CHRONIC KIDNEY DISEASE) STAGE 2, GFR 60-89 ML/MIN: ICD-10-CM

## 2025-03-21 DIAGNOSIS — N13.30 HYDRONEPHROSIS, UNSPECIFIED HYDRONEPHROSIS TYPE: ICD-10-CM

## 2025-03-21 DIAGNOSIS — R80.1 PERSISTENT PROTEINURIA: Primary | ICD-10-CM

## 2025-03-21 DIAGNOSIS — I10 HYPERTENSION, ESSENTIAL: ICD-10-CM

## 2025-03-21 DIAGNOSIS — F17.210 CIGARETTE SMOKER: ICD-10-CM

## 2025-03-21 DIAGNOSIS — M05.79 RHEUMATOID ARTHRITIS INVOLVING MULTIPLE SITES WITH POSITIVE RHEUMATOID FACTOR (HCC): Primary | ICD-10-CM

## 2025-03-21 LAB
25(OH)D3+25(OH)D2 SERPL-MCNC: 26.1 NG/ML (ref 30–100)
ALBUMIN SERPL-MCNC: 4.4 G/DL (ref 3.9–4.9)
ALBUMIN/CREAT UR: 656 MG/G CREAT (ref 0–29)
ALP SERPL-CCNC: 129 IU/L (ref 44–121)
ALT SERPL-CCNC: 7 IU/L (ref 0–32)
APPEARANCE UR: CLEAR
AST SERPL-CCNC: 16 IU/L (ref 0–40)
BACTERIA URNS QL MICRO: NORMAL
BASOPHILS # BLD AUTO: 0.1 X10E3/UL (ref 0–0.2)
BASOPHILS NFR BLD AUTO: 1 %
BILIRUB SERPL-MCNC: <0.2 MG/DL (ref 0–1.2)
BILIRUB UR QL STRIP: NEGATIVE
BUN SERPL-MCNC: 23 MG/DL (ref 8–27)
BUN/CREAT SERPL: 27 (ref 12–28)
CALCIUM SERPL-MCNC: 9.7 MG/DL (ref 8.7–10.3)
CASTS URNS QL MICRO: NORMAL /LPF
CHLORIDE SERPL-SCNC: 102 MMOL/L (ref 96–106)
CO2 SERPL-SCNC: 16 MMOL/L (ref 20–29)
COLOR UR: YELLOW
CREAT SERPL-MCNC: 0.84 MG/DL (ref 0.57–1)
CREAT UR-MCNC: 29.8 MG/DL
EGFR: 78 ML/MIN/1.73
EOSINOPHIL # BLD AUTO: 0.1 X10E3/UL (ref 0–0.4)
EOSINOPHIL NFR BLD AUTO: 2 %
EPI CELLS #/AREA URNS HPF: NORMAL /HPF (ref 0–10)
ERYTHROCYTE [DISTWIDTH] IN BLOOD BY AUTOMATED COUNT: 15.8 % (ref 11.7–15.4)
GLOBULIN SER-MCNC: 3.3 G/DL (ref 1.5–4.5)
GLUCOSE SERPL-MCNC: 88 MG/DL (ref 70–99)
GLUCOSE UR QL: ABNORMAL
HCT VFR BLD AUTO: 36.8 % (ref 34–46.6)
HGB BLD-MCNC: 11.8 G/DL (ref 11.1–15.9)
HGB UR QL STRIP: NEGATIVE
IMM GRANULOCYTES # BLD: 0 X10E3/UL (ref 0–0.1)
IMM GRANULOCYTES NFR BLD: 0 %
KETONES UR QL STRIP: NEGATIVE
LEUKOCYTE ESTERASE UR QL STRIP: NEGATIVE
LYMPHOCYTES # BLD AUTO: 2 X10E3/UL (ref 0.7–3.1)
LYMPHOCYTES NFR BLD AUTO: 28 %
MCH RBC QN AUTO: 25.4 PG (ref 26.6–33)
MCHC RBC AUTO-ENTMCNC: 32.1 G/DL (ref 31.5–35.7)
MCV RBC AUTO: 79 FL (ref 79–97)
MICRO URNS: ABNORMAL
MICROALBUMIN UR-MCNC: 195.5 UG/ML
MONOCYTES # BLD AUTO: 0.7 X10E3/UL (ref 0.1–0.9)
MONOCYTES NFR BLD AUTO: 10 %
NEUTROPHILS # BLD AUTO: 4.3 X10E3/UL (ref 1.4–7)
NEUTROPHILS NFR BLD AUTO: 59 %
NITRITE UR QL STRIP: NEGATIVE
PH UR STRIP: 6.5 [PH] (ref 5–7.5)
PHOSPHATE SERPL-MCNC: 3.8 MG/DL (ref 3–4.3)
PLATELET # BLD AUTO: 246 X10E3/UL (ref 150–450)
POTASSIUM SERPL-SCNC: 4.5 MMOL/L (ref 3.5–5.2)
PROT SERPL-MCNC: 7.7 G/DL (ref 6–8.5)
PROT UR QL STRIP: ABNORMAL
PTH-INTACT SERPL-MCNC: 34 PG/ML (ref 15–65)
RBC # BLD AUTO: 4.64 X10E6/UL (ref 3.77–5.28)
RBC #/AREA URNS HPF: NORMAL /HPF (ref 0–2)
SODIUM SERPL-SCNC: 139 MMOL/L (ref 134–144)
SP GR UR: 1.01 (ref 1–1.03)
UROBILINOGEN UR STRIP-ACNC: 0.2 MG/DL (ref 0.2–1)
WBC # BLD AUTO: 7.3 X10E3/UL (ref 3.4–10.8)
WBC #/AREA URNS HPF: NORMAL /HPF (ref 0–5)

## 2025-03-21 PROCEDURE — 99204 OFFICE O/P NEW MOD 45 MIN: CPT | Performed by: INTERNAL MEDICINE

## 2025-03-21 RX ORDER — ABATACEPT 125 MG/ML
125 INJECTION, SOLUTION SUBCUTANEOUS
COMMUNITY
Start: 2025-01-21

## 2025-03-21 RX ORDER — HYDROXYCHLOROQUINE SULFATE 200 MG/1
200 TABLET, FILM COATED ORAL DAILY
COMMUNITY
Start: 2025-01-16

## 2025-03-21 RX ORDER — BUDESONIDE 0.25 MG/2ML
0.25 INHALANT ORAL 2 TIMES DAILY
COMMUNITY

## 2025-03-21 RX ORDER — DAPAGLIFLOZIN 10 MG/1
10 TABLET, FILM COATED ORAL DAILY
COMMUNITY

## 2025-03-21 RX ORDER — NORTRIPTYLINE HYDROCHLORIDE 10 MG/1
CAPSULE ORAL
COMMUNITY
Start: 2025-02-21

## 2025-03-21 NOTE — LETTER
2025     López Redmond MD  [address] [facility]  Second Floor  [facility] [address]    Patient: Lynn Romagnano   YOB: 1960   Date of Visit: 3/21/2025       Dear Dr. Redmond:    Thank you for referring Lynn Romagnano to me for evaluation. Below are my notes for this consultation.    If you have questions, please do not hesitate to call me. I look forward to following your patient along with you.         Sincerely,        Carroll Godfrey MD        CC: No Recipients    Carroll Godfrey MD  3/21/2025 11:52 AM  Incomplete  Name: Lynn Romagnano      : 1960      MRN: [id number]  Encounter Provider: Carroll Godfrey MD  Encounter Date: 3/21/2025   Encounter department: [facility]  :  Assessment & Plan  Persistent proteinuria    Orders:  •  Ambulatory Referral to Interventional Radiology; Future  •  ANCA Screen With MPO and PR3 With Reflex To ANCA Titer; Future  •  Immunoglobulin free LT chains blood; Future  •  Hepatitis C antibody; Future  •  Cryoglobulin; Future  •  Protein electrophoresis, urine; Future  •  RHEUMATOID FACTOR; Future    Longstanding history of subnephrotic range proteinuria that dates back to .  Upon review of Care Everywhere, it appears that extensive workup had been done including serological workup.  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  Patient has been on Plaquenil and receiving weekly abatacept injection with stable symptoms.  On multiple occasion her lupus antibodies have been negative to date.  Patient has been kept on lisinopril and Farxiga as antiproteinuric agents.  Most recent quantification revealed 2.2 g of protein and elevated.  Paraproteinemia workup has been negative as well.  Fortunately patient renal function has been within normal range with serum creatinine of 0.76 mg/dL and EGFR of 88 and stable.  Will attempt to decipher the cause of  proteinuria by having patient undergo a renal biopsy in the near future.  Patient is asked to hold off on aspirin 7 days prior to biopsy.  She is also asked to hold off on NSAIDs at this time.  Remaining serological workup that includes ANCA titer, hepatitis C antibody will be ordered.   A renal ultrasound will be ordered to evaluate patient's renal parenchyma.  CKD (chronic kidney disease) stage 2, GFR 60-89 ml/min  Lab Results   Component Value Date    EGFR 62 01/08/2025    EGFR 88 01/04/2023    EGFR 52 (L) 06/08/2020    CREATININE 1.01 01/08/2025    CREATININE 0.77 01/04/2023    CREATININE 0.97 06/15/2021       Orders:  •  Ambulatory Referral to Nephrology  •  Comprehensive metabolic panel; Standing  •  Urinalysis with microscopic; Future  •  Albumin / creatinine urine ratio; Standing  •  Albumin; Standing  •  CBC and differential; Standing  •  Calcium; Standing  •  Phosphorus; Standing  Longstanding history of chronic kidney disease stage II owing to having proteinuria.  Most recent labs revealed serum creatinine of 0.76 mg/dL.  Hydronephrosis, unspecified hydronephrosis type    Orders:  •  US kidney and bladder; Future  Will obtain renal ultrasound to evaluate for hydronephrosis.  Hypertension, essential  Patient blood pressure within normal range while on lisinopril 20 mg daily.       Thoracic aortic aneurysm without rupture, unspecified part (HCC)  Prior history of aneurysm and follows up with vascular surgery at [facility].       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  Currently receiving abatacept injections as well as on Plaquenil.  Lupus parameters have been negative.       Cigarette smoker  Instructed patient to quit smoking.           History of Present Illness  HPI  Lynn Romagnano is a 64 y.o. female who presents to renal office for management of ongoing proteinuria.  Patient  "moved to [facility] from [place] recently.  Admits to having longstanding history of proteinuria that has been attempted to be managed with ACE inhibitor and SGLT2 inhibitors.  Patient admits to being a chronic smoker and continues to smoke cigarettes.  She was also diagnosed with juvenile arthritis and is currently on abatacept and Plaquenil for management of mixed connective tissue disease disorder.  She complains of Raynaud's phenomena with fingers affected.  Denies any chest pain or shortness of breath.  Patient had been on lisinopril 40 mg daily until a month ago when she had a syncopal episode and was stopped completely.  Medication resumed at a low dose 20 mg daily by her PCP. she does complain of foamy urine.  Admits to intermittent use of NSAIDs.  History obtained from: patient    Review of Systems   Constitutional: Negative.    HENT: Negative.     Eyes: Negative.    Respiratory: Negative.     Cardiovascular: Negative.    Gastrointestinal: Negative.    Endocrine: Negative.    Genitourinary: Negative.    Musculoskeletal: Negative.    Skin: Negative.    Allergic/Immunologic: Negative.    Neurological: Negative.    Hematological: Negative.    All other systems reviewed and are negative.    Medical History Reviewed by provider this encounter:     .     Objective  /80 (Patient Position: Sitting, Cuff Size: Standard)   Pulse 90   Temp (!) 97.2 °F (36.2 °C) (Temporal)   Resp 16   Ht 5' 3\" (1.6 m)   Wt 48.1 kg (106 lb)   SpO2 96%   BMI 18.78 kg/m²      Physical Exam  Constitutional:       Appearance: She is well-developed.   HENT:      Head: Normocephalic and atraumatic.   Eyes:      Pupils: Pupils are equal, round, and reactive to light.   Cardiovascular:      Rate and Rhythm: Normal rate and regular rhythm.      Heart sounds: Normal heart sounds.   Pulmonary:      Effort: Pulmonary effort is normal.   Abdominal:      General: Bowel sounds are normal.      Palpations: Abdomen is soft. "   Musculoskeletal:         General: Normal range of motion.      Cervical back: Neck supple.   Skin:     General: Skin is warm.   Neurological:      Mental Status: She is alert and oriented to person, place, and time.         Administrative Statements  I have spent a total time of 68 minutes in caring for this patient on the day of the visit/encounter including Diagnostic results, Prognosis, Risks and benefits of tx options, Instructions for management, Patient and family education, Importance of tx compliance, Risk factor reductions, Impressions, Counseling / Coordination of care, and Documenting in the medical record.

## 2025-03-21 NOTE — PROGRESS NOTES
Name: Lynn Romagnano      : 1960      MRN: [id number]  Encounter Provider: Carroll Godfrey MD  Encounter Date: 3/21/2025   Encounter department: [facility]  :  Assessment & Plan  Persistent proteinuria    Orders:    Ambulatory Referral to Interventional Radiology; Future    ANCA Screen With MPO and PR3 With Reflex To ANCA Titer; Future    Immunoglobulin free LT chains blood; Future    Hepatitis C antibody; Future    Cryoglobulin; Future    Protein electrophoresis, urine; Future    RHEUMATOID FACTOR; Future    Longstanding history of subnephrotic range proteinuria that dates back to .  Upon review of Care Everywhere, it appears that extensive workup had been done including serological workup.  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  Patient has been on Plaquenil and receiving weekly abatacept injection with stable symptoms.  On multiple occasion her lupus antibodies have been negative to date.  Patient has been kept on lisinopril and Farxiga as antiproteinuric agents.  Most recent quantification revealed 2.2 g of protein and elevated.  Paraproteinemia workup has been negative as well.  Fortunately patient renal function has been within normal range with serum creatinine of 0.76 mg/dL and EGFR of 88 and stable.  Will attempt to decipher the cause of proteinuria by having patient undergo a renal biopsy in the near future.  Patient is asked to hold off on aspirin 7 days prior to biopsy.  She is also asked to hold off on NSAIDs at this time.  Remaining serological workup that includes ANCA titer, hepatitis C antibody will be ordered.   A renal ultrasound will be ordered to evaluate patient's renal parenchyma.  CKD (chronic kidney disease) stage 2, GFR 60-89 ml/min  Lab Results   Component Value Date    EGFR 62 2025    EGFR 88 2023    EGFR 52 (L) 2020    CREATININE 1.01 2025     CREATININE 0.77 01/04/2023    CREATININE 0.97 06/15/2021       Orders:    Ambulatory Referral to Nephrology    Comprehensive metabolic panel; Standing    Urinalysis with microscopic; Future    Albumin / creatinine urine ratio; Standing    Albumin; Standing    CBC and differential; Standing    Calcium; Standing    Phosphorus; Standing  Longstanding history of chronic kidney disease stage II owing to having proteinuria.  Most recent labs revealed serum creatinine of 0.76 mg/dL.  Hydronephrosis, unspecified hydronephrosis type    Orders:    US kidney and bladder; Future  Will obtain renal ultrasound to evaluate for hydronephrosis.  Hypertension, essential  Patient blood pressure within normal range while on lisinopril 20 mg daily.       Thoracic aortic aneurysm without rupture, unspecified part (HCC)  Prior history of aneurysm and follows up with vascular surgery at [facility].       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  Currently receiving abatacept injections as well as on Plaquenil.  Lupus parameters have been negative.       Cigarette smoker  Instructed patient to quit smoking.           History of Present Illness   HPI  Lynn Romagnano is a 64 y.o. female who presents to renal office for management of ongoing proteinuria.  Patient moved to [facility] from [place] recently.  Admits to having longstanding history of proteinuria that has been attempted to be managed with ACE inhibitor and SGLT2 inhibitors.  Patient admits to being a chronic smoker and continues to smoke cigarettes.  She was also diagnosed with juvenile arthritis and is currently on abatacept and Plaquenil for management of mixed connective tissue disease disorder.  She complains of Raynaud's phenomena with fingers affected.  Denies any chest pain or shortness of breath.  Patient had been on lisinopril 40 mg daily  "until a month ago when she had a syncopal episode and was stopped completely.  Medication resumed at a low dose 20 mg daily by her PCP. she does complain of foamy urine.  Admits to intermittent use of NSAIDs.  History obtained from: patient    Review of Systems   Constitutional: Negative.    HENT: Negative.     Eyes: Negative.    Respiratory: Negative.     Cardiovascular: Negative.    Gastrointestinal: Negative.    Endocrine: Negative.    Genitourinary: Negative.    Musculoskeletal: Negative.    Skin: Negative.    Allergic/Immunologic: Negative.    Neurological: Negative.    Hematological: Negative.    All other systems reviewed and are negative.    Medical History Reviewed by provider this encounter:     .     Objective   /80 (Patient Position: Sitting, Cuff Size: Standard)   Pulse 90   Temp (!) 97.2 °F (36.2 °C) (Temporal)   Resp 16   Ht 5' 3\" (1.6 m)   Wt 48.1 kg (106 lb)   SpO2 96%   BMI 18.78 kg/m²      Physical Exam  Constitutional:       Appearance: She is well-developed.   HENT:      Head: Normocephalic and atraumatic.   Eyes:      Pupils: Pupils are equal, round, and reactive to light.   Cardiovascular:      Rate and Rhythm: Normal rate and regular rhythm.      Heart sounds: Normal heart sounds.   Pulmonary:      Effort: Pulmonary effort is normal.   Abdominal:      General: Bowel sounds are normal.      Palpations: Abdomen is soft.   Musculoskeletal:         General: Normal range of motion.      Cervical back: Neck supple.   Skin:     General: Skin is warm.   Neurological:      Mental Status: She is alert and oriented to person, place, and time.         Administrative Statements   I have spent a total time of 68 minutes in caring for this patient on the day of the visit/encounter including Diagnostic results, Prognosis, Risks and benefits of tx options, Instructions for management, Patient and family education, Importance of tx compliance, Risk factor reductions, Impressions, Counseling / " Coordination of care, and Documenting in the medical record.

## 2025-03-21 NOTE — LETTER
2025     Pia Chamorro MD  [address] [place] [id number]    Patient: Lynn Romagnano   YOB: 1960   Date of Visit: 3/21/2025       Dear Dr. Chamorro:    Thank you for referring Lynn Romagnano to me for evaluation. Below are my notes for this consultation.    If you have questions, please do not hesitate to call me. I look forward to following your patient along with you.         Sincerely,        Carroll Godfrey MD        CC: No Recipients    Carroll Godfrey MD  3/21/2025 11:53 AM  Sign when Signing Visit  Name: Lynn Romagnano      : 1960      MRN: [id number]  Encounter Provider: Carroll Godfrey MD  Encounter Date: 3/21/2025   Encounter department: [facility]  :  Assessment & Plan  Persistent proteinuria    Orders:  •  Ambulatory Referral to Interventional Radiology; Future  •  ANCA Screen With MPO and PR3 With Reflex To ANCA Titer; Future  •  Immunoglobulin free LT chains blood; Future  •  Hepatitis C antibody; Future  •  Cryoglobulin; Future  •  Protein electrophoresis, urine; Future  •  RHEUMATOID FACTOR; Future    Longstanding history of subnephrotic range proteinuria that dates back to .  Upon review of Care Everywhere, it appears that extensive workup had been done including serological workup.  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  Patient has been on Plaquenil and receiving weekly abatacept injection with stable symptoms.  On multiple occasion her lupus antibodies have been negative to date.  Patient has been kept on lisinopril and Farxiga as antiproteinuric agents.  Most recent quantification revealed 2.2 g of protein and elevated.  Paraproteinemia workup has been negative as well.  Fortunately patient renal function has been within normal range with serum creatinine of 0.76 mg/dL and EGFR of 88 and stable.  Will attempt to decipher the cause of proteinuria by  having patient undergo a renal biopsy in the near future.  Patient is asked to hold off on aspirin 7 days prior to biopsy.  She is also asked to hold off on NSAIDs at this time.  Remaining serological workup that includes ANCA titer, hepatitis C antibody will be ordered.   A renal ultrasound will be ordered to evaluate patient's renal parenchyma.  CKD (chronic kidney disease) stage 2, GFR 60-89 ml/min  Lab Results   Component Value Date    EGFR 62 01/08/2025    EGFR 88 01/04/2023    EGFR 52 (L) 06/08/2020    CREATININE 1.01 01/08/2025    CREATININE 0.77 01/04/2023    CREATININE 0.97 06/15/2021       Orders:  •  Ambulatory Referral to Nephrology  •  Comprehensive metabolic panel; Standing  •  Urinalysis with microscopic; Future  •  Albumin / creatinine urine ratio; Standing  •  Albumin; Standing  •  CBC and differential; Standing  •  Calcium; Standing  •  Phosphorus; Standing  Longstanding history of chronic kidney disease stage II owing to having proteinuria.  Most recent labs revealed serum creatinine of 0.76 mg/dL.  Hydronephrosis, unspecified hydronephrosis type    Orders:  •  US kidney and bladder; Future  Will obtain renal ultrasound to evaluate for hydronephrosis.  Hypertension, essential  Patient blood pressure within normal range while on lisinopril 20 mg daily.       Thoracic aortic aneurysm without rupture, unspecified part (HCC)  Prior history of aneurysm and follows up with vascular surgery at [facility].       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  Currently receiving abatacept injections as well as on Plaquenil.  Lupus parameters have been negative.       Cigarette smoker  Instructed patient to quit smoking.           History of Present Illness  HPI  Lynn Romagnano is a 64 y.o. female who presents to renal office for management of ongoing proteinuria.  Patient moved to  [facility] from [place] recently.  Admits to having longstanding history of proteinuria that has been attempted to be managed with ACE inhibitor and SGLT2 inhibitors.  Patient admits to being a chronic smoker and continues to smoke cigarettes.  She was also diagnosed with juvenile arthritis and is currently on abatacept and Plaquenil for management of mixed connective tissue disease disorder.  She complains of Raynaud's phenomena with fingers affected.  Denies any chest pain or shortness of breath.  Patient had been on lisinopril 40 mg daily until a month ago when she had a syncopal episode and was stopped completely.  Medication resumed at a low dose 20 mg daily by her PCP. she does complain of foamy urine.  Admits to intermittent use of NSAIDs.  History obtained from: patient    Review of Systems   Constitutional: Negative.    HENT: Negative.     Eyes: Negative.    Respiratory: Negative.     Cardiovascular: Negative.    Gastrointestinal: Negative.    Endocrine: Negative.    Genitourinary: Negative.    Musculoskeletal: Negative.    Skin: Negative.    Allergic/Immunologic: Negative.    Neurological: Negative.    Hematological: Negative.    All other systems reviewed and are negative.    Medical History Reviewed by provider this encounter:     .     Objective  /80 (Patient Position: Sitting, Cuff Size: Standard)   Pulse 90   Temp (!) 97.2 °F (36.2 °C) (Temporal)   Resp 16   Ht 5' 3\" (1.6 m)   Wt 48.1 kg (106 lb)   SpO2 96%   BMI 18.78 kg/m²      Physical Exam  Constitutional:       Appearance: She is well-developed.   HENT:      Head: Normocephalic and atraumatic.   Eyes:      Pupils: Pupils are equal, round, and reactive to light.   Cardiovascular:      Rate and Rhythm: Normal rate and regular rhythm.      Heart sounds: Normal heart sounds.   Pulmonary:      Effort: Pulmonary effort is normal.   Abdominal:      General: Bowel sounds are normal.      Palpations: Abdomen is soft. "   Musculoskeletal:         General: Normal range of motion.      Cervical back: Neck supple.   Skin:     General: Skin is warm.   Neurological:      Mental Status: She is alert and oriented to person, place, and time.         Administrative Statements  I have spent a total time of 68 minutes in caring for this patient on the day of the visit/encounter including Diagnostic results, Prognosis, Risks and benefits of tx options, Instructions for management, Patient and family education, Importance of tx compliance, Risk factor reductions, Impressions, Counseling / Coordination of care, and Documenting in the medical record.

## 2025-03-21 NOTE — ASSESSMENT & PLAN NOTE
Prior history of aneurysm and follows up with vascular surgery at [facility].

## 2025-03-21 NOTE — ASSESSMENT & PLAN NOTE
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  Currently receiving abatacept injections as well as on Plaquenil.  Lupus parameters have been negative.

## 2025-03-21 NOTE — ASSESSMENT & PLAN NOTE
Procedure Instructions  ________________________________________________________________    Preparing for Your Procedure  · If you have an implanted cardiac device (pacemaker, defibrillator) please notify our office.   • Purchase a new toothbrush.   • If you are going home on pain medications, buy laxatives or stool softeners as pain medications can cause constipation.  • Stop smoking and use of tobacco products.  • Exercise and be as active as you can. Being active may help shorten your hospital stay, decrease your chance of pneumonia and help you feel better.  • Do breathing exercises at least 2 times a day: Take 10 slow, deep breaths, then cough hard 3 times. After surgery, you may be given other instructions about deep breathing.  • If you are going home on the day of your procedure:  o Plan for a responsible adult to drive you home (Taxi/Uber not acceptable). If you do not have someone to drive you home, your procedure may be canceled.   o It is recommended that someone stay with you for the first 24 hours.  • If the patient is under 18 years old, a parent or authorized adult needs to stay at the hospital until the child is discharged from the surgery area or admitted to a hospital bed.  • Contact your insurance company to determine coverage. Insurance may require prior authorization. We can help with this, but it is your responsibility. Charges for this procedure may include fees for the surgeon and their assistant, facility, anesthesia, and other services such as lab, radiology, pathology, etc.  • You may be called to make payment arrangements.  • If you have forms that need to be completed (such as disability, work release or FMLA), drop these off at our office before the procedure date. A Release of Information form may need to be completed to allow us to share information with the insurance company or your employer.    NOTE:  Follow these instructions to help avoid delays or cancellation of your    Orders:    Ambulatory Referral to Interventional Radiology; Future    ANCA Screen With MPO and PR3 With Reflex To ANCA Titer; Future    Immunoglobulin free LT chains blood; Future    Hepatitis C antibody; Future    Cryoglobulin; Future    Protein electrophoresis, urine; Future    RHEUMATOID FACTOR; Future    Longstanding history of subnephrotic range proteinuria that dates back to 2020.  Upon review of Care Everywhere, it appears that extensive workup had been done including serological workup.  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  Patient has been on Plaquenil and receiving weekly abatacept injection with stable symptoms.  On multiple occasion her lupus antibodies have been negative to date.  Patient has been kept on lisinopril and Farxiga as antiproteinuric agents.  Most recent quantification revealed 2.2 g of protein and elevated.  Paraproteinemia workup has been negative as well.  Fortunately patient renal function has been within normal range with serum creatinine of 0.76 mg/dL and EGFR of 88 and stable.  Will attempt to decipher the cause of proteinuria by having patient undergo a renal biopsy in the near future.  Patient is asked to hold off on aspirin 7 days prior to biopsy.  She is also asked to hold off on NSAIDs at this time.  Remaining serological workup that includes ANCA titer, hepatitis C antibody will be ordered.   A renal ultrasound will be ordered to evaluate patient's renal parenchyma.   procedure. ________________________________________________________________    THE WEEK BEFORE PROCEDURE  Call our office at [phone] if you have:  • Signs of illness, such as fever, sore throat or a cold.  • Any skin problems, rashes, bug bites or open sores in area of procedure.  • An exposure to COVID-19 or develop COVID-19 symptoms.    Expect a phone call from a hospital nurse to review your health history, medications and procedure plans.  For patient and community safety, facilities may have restrictions on persons accompanying patients in the building.  Please confirm current restrictions during preoperative call with hospital nurse.      2 DAYS BEFORE PROCEDURE  • Begin using the new toothbrush. Brush teeth and tongue at least 2 times a day.  • Do not shave near the surgical area until after the procedure.  You may be given specific DIET INSTRUCTIONS.  Follow these closely.      DAY BEFORE PROCEDURE  • Review and follow your specific DIET INSTRUCTIONS.  • DO NOT use tobacco or alcohol.  • DO NOT use recreational or illegal drugs.  • Remove nail polish from fingers and toes. Nails should be clean. Remove artificial nails.   • Remove all jewelry and body piercings.  • Review and follow your specific BATHING INSTRUCTIONS.   • Put on clean clothing or pajamas after bathing.  • Sleep with clean bedsheets and don't allow pets to sleep in your bed.    MORNING OF YOUR PROCEDURE  • Brush teeth and tongue.  • Wear loose, comfortable clothing.  • DO NOT wear any makeup.  • DO NOT apply lotions, powders or hair products.  • DO NOT eat anything, including chewing gum, mints or candy.    Bring these items with you:  • Items you rely on such as hearing aids, glasses, cane or walker.  • Any inhalers you use.  • CPAP/BiPAP machine, if you use one.  • Insurance cards and ID.  • A copy of your Advance Directive, if you have one.    Do not bring these items with you:  • DO NOT bring medications, unless you are told  differently  • DO NOT bring valuables (jewelry, cash, etc.)  • DO NOT wear contact lenses. Wear glasses if needed.     ________________________________________________________________    Things to Know After Surgery    Go to an Emergency Room if you have:  · trouble breathing or chest pain  · unusual pain, redness or swelling in one of your legs    Contact our office if you:  • are unable to keep fluids down. Nausea can be related to anesthesia or pain medicines.  • have a fever of 101 degrees or higher.  • have increasing pain not relieved with medication.   • have drainage from your incision that is increasing, or has a bad odor.  • are unable to empty your bladder for more than 12 hours.    • DO NOT make important decisions for at least 24 hours after your procedure as you will likely still have anesthesia in your system.    • DO NOT drive, operate heavy machinery, or drink alcohol for at least 24 hours after your procedure or while taking narcotics.    • A sore throat can result from the breathing tube used to administer general anesthesia. This usually lasts 1-2 days. Cold liquids, ice chips, and throat lozenges help relieve the soreness.  • If you received a nerve block during your procedure, it usually wears off within 12 to 24 hours.  • Follow the specific instructions on your discharge paperwork from the hospital on how to manage any dressings or drains.   • You will get instructions about activity restrictions. Expect that you may have to limit your activities; our goal is to have you return to normal activities as soon as possible. This will be discussed at your first post-operative visit.  • Discuss with your provider how long you may be off work or have work restrictions.      Medications and Refills  • You will get instructions and/or prescriptions to help manage your pain.  • It is important to take your pain medications as prescribed during the first 48 hours to prevent your pain from getting out of  control.   • Pain medications can cause constipation. Take a stool softener (e.g., Colace) daily while using prescription pain medication. If you do not have a bowel movement within 3 days after your procedure, take MiraLAX (or generic polyethylene glycol): 1 dose in the morning and 1 dose at bedtime until bowel movements are back to normal. When you stop your pain medication, you should be able to stop your stool softener.   · Prescription refill requests are handled during normal business hours, Monday thru Friday 8 am - 4:30pm. Refills will not be provided on weekends or after hours.   · Please allow 24-hours to respond to any refill requests.    · Some narcotic prescriptions cannot be called directly into the pharmacy. Not all pharmacies accept electronic refills. Please take this into consideration when contacting our office, you may need to  a hand-written prescription from one of our offices, or  allow for [employer] Postal Service mail delivery of the written prescription.

## 2025-03-28 ENCOUNTER — HOSPITAL ENCOUNTER (OUTPATIENT)
Dept: ULTRASOUND IMAGING | Facility: CLINIC | Age: 65
Discharge: HOME | End: 2025-03-28
Payer: MEDICARE

## 2025-03-28 DIAGNOSIS — N13.30 HYDRONEPHROSIS, UNSPECIFIED HYDRONEPHROSIS TYPE: ICD-10-CM

## 2025-03-28 PROCEDURE — 76775 US EXAM ABDO BACK WALL LIM: CPT

## 2025-04-23 ENCOUNTER — HOSPITAL ENCOUNTER (OUTPATIENT)
Dept: CT IMAGING | Facility: HOSPITAL | Age: 65
Discharge: HOME | End: 2025-04-23
Attending: STUDENT IN AN ORGANIZED HEALTH CARE EDUCATION/TRAINING PROGRAM | Admitting: RADIOLOGY
Payer: MEDICARE

## 2025-04-23 VITALS
RESPIRATION RATE: 22 BRPM | SYSTOLIC BLOOD PRESSURE: 105 MMHG | HEART RATE: 104 BPM | HEIGHT: 63 IN | OXYGEN SATURATION: 99 % | TEMPERATURE: 97.9 F | WEIGHT: 104 LBS | BODY MASS INDEX: 18.43 KG/M2 | DIASTOLIC BLOOD PRESSURE: 53 MMHG

## 2025-04-23 DIAGNOSIS — M05.79 RHEUMATOID ARTHRITIS INVOLVING MULTIPLE SITES WITH POSITIVE RHEUMATOID FACTOR (HCC): ICD-10-CM

## 2025-04-23 LAB
BASOPHILS # BLD AUTO: 0.05 THOUSANDS/ÂΜL (ref 0–0.1)
BASOPHILS NFR BLD AUTO: 1 % (ref 0–1)
EOSINOPHIL # BLD AUTO: 0.13 THOUSAND/ÂΜL (ref 0–0.61)
EOSINOPHIL NFR BLD AUTO: 2 % (ref 0–6)
ERYTHROCYTE [DISTWIDTH] IN BLOOD BY AUTOMATED COUNT: 17.2 % (ref 11.6–15.1)
HCT VFR BLD AUTO: 38.2 % (ref 34.8–46.1)
HGB BLD-MCNC: 11.5 G/DL (ref 11.5–15.4)
IMM GRANULOCYTES # BLD AUTO: 0.03 THOUSAND/UL (ref 0–0.2)
IMM GRANULOCYTES NFR BLD AUTO: 0 % (ref 0–2)
INR PPP: 0.97 (ref 0.85–1.19)
LYMPHOCYTES # BLD AUTO: 1.79 THOUSANDS/ÂΜL (ref 0.6–4.47)
LYMPHOCYTES NFR BLD AUTO: 22 % (ref 14–44)
MCH RBC QN AUTO: 24.7 PG (ref 26.8–34.3)
MCHC RBC AUTO-ENTMCNC: 30.1 G/DL (ref 31.4–37.4)
MCV RBC AUTO: 82 FL (ref 82–98)
MONOCYTES # BLD AUTO: 0.62 THOUSAND/ÂΜL (ref 0.17–1.22)
MONOCYTES NFR BLD AUTO: 8 % (ref 4–12)
NEUTROPHILS # BLD AUTO: 5.41 THOUSANDS/ÂΜL (ref 1.85–7.62)
NEUTS SEG NFR BLD AUTO: 67 % (ref 43–75)
NRBC BLD AUTO-RTO: 0 /100 WBCS
PLATELET # BLD AUTO: 267 THOUSANDS/UL (ref 149–390)
PMV BLD AUTO: 9.5 FL (ref 8.9–12.7)
PROTHROMBIN TIME: 13.6 SECONDS (ref 12.3–15)
RBC # BLD AUTO: 4.66 MILLION/UL (ref 3.81–5.12)
WBC # BLD AUTO: 8.03 THOUSAND/UL (ref 4.31–10.16)

## 2025-04-23 PROCEDURE — 99153 MOD SED SAME PHYS/QHP EA: CPT

## 2025-04-23 PROCEDURE — 88350 IMFLUOR EA ADDL 1ANTB STN PX: CPT | Performed by: INTERNAL MEDICINE

## 2025-04-23 PROCEDURE — 50200 RENAL BIOPSY PERQ: CPT

## 2025-04-23 PROCEDURE — 88313 SPECIAL STAINS GROUP 2: CPT | Performed by: INTERNAL MEDICINE

## 2025-04-23 PROCEDURE — 85610 PROTHROMBIN TIME: CPT | Performed by: STUDENT IN AN ORGANIZED HEALTH CARE EDUCATION/TRAINING PROGRAM

## 2025-04-23 PROCEDURE — 99152 MOD SED SAME PHYS/QHP 5/>YRS: CPT

## 2025-04-23 PROCEDURE — 88346 IMFLUOR 1ST 1ANTB STAIN PX: CPT | Performed by: INTERNAL MEDICINE

## 2025-04-23 PROCEDURE — 77012 CT SCAN FOR NEEDLE BIOPSY: CPT

## 2025-04-23 PROCEDURE — 88348 ELECTRON MICROSCOPY DX: CPT | Performed by: INTERNAL MEDICINE

## 2025-04-23 PROCEDURE — 88329 PATH CONSLTJ DRG SURG: CPT | Performed by: PATHOLOGY

## 2025-04-23 PROCEDURE — 85025 COMPLETE CBC W/AUTO DIFF WBC: CPT | Performed by: STUDENT IN AN ORGANIZED HEALTH CARE EDUCATION/TRAINING PROGRAM

## 2025-04-23 PROCEDURE — 88305 TISSUE EXAM BY PATHOLOGIST: CPT | Performed by: INTERNAL MEDICINE

## 2025-04-23 RX ORDER — OXYCODONE AND ACETAMINOPHEN 5; 325 MG/1; MG/1
1 TABLET ORAL ONCE
Refills: 0 | Status: COMPLETED | OUTPATIENT
Start: 2025-04-23 | End: 2025-04-23

## 2025-04-23 RX ORDER — LIDOCAINE WITH 8.4% SOD BICARB 0.9%(10ML)
SYRINGE (ML) INJECTION AS NEEDED
Status: COMPLETED | OUTPATIENT
Start: 2025-04-23 | End: 2025-04-23

## 2025-04-23 RX ORDER — HYDRALAZINE HYDROCHLORIDE 20 MG/ML
INJECTION INTRAMUSCULAR; INTRAVENOUS AS NEEDED
Status: COMPLETED | OUTPATIENT
Start: 2025-04-23 | End: 2025-04-23

## 2025-04-23 RX ORDER — MIDAZOLAM HYDROCHLORIDE 2 MG/2ML
INJECTION, SOLUTION INTRAMUSCULAR; INTRAVENOUS AS NEEDED
Status: COMPLETED | OUTPATIENT
Start: 2025-04-23 | End: 2025-04-23

## 2025-04-23 RX ORDER — FENTANYL CITRATE 50 UG/ML
INJECTION, SOLUTION INTRAMUSCULAR; INTRAVENOUS AS NEEDED
Status: COMPLETED | OUTPATIENT
Start: 2025-04-23 | End: 2025-04-23

## 2025-04-23 RX ADMIN — FENTANYL CITRATE 50 MCG: 50 INJECTION, SOLUTION INTRAMUSCULAR; INTRAVENOUS at 10:22

## 2025-04-23 RX ADMIN — HYDRALAZINE HYDROCHLORIDE 10 MG: 20 INJECTION INTRAMUSCULAR; INTRAVENOUS at 10:34

## 2025-04-23 RX ADMIN — FENTANYL CITRATE 50 MCG: 50 INJECTION, SOLUTION INTRAMUSCULAR; INTRAVENOUS at 10:35

## 2025-04-23 RX ADMIN — Medication 10 ML: at 10:34

## 2025-04-23 RX ADMIN — OXYCODONE AND ACETAMINOPHEN 1 TABLET: 5; 325 TABLET ORAL at 11:45

## 2025-04-23 RX ADMIN — MIDAZOLAM HYDROCHLORIDE 1 MG: 1 INJECTION, SOLUTION INTRAMUSCULAR; INTRAVENOUS at 10:35

## 2025-04-23 RX ADMIN — MIDAZOLAM HYDROCHLORIDE 1 MG: 1 INJECTION, SOLUTION INTRAMUSCULAR; INTRAVENOUS at 10:22

## 2025-04-23 NOTE — H&P
Interventional Radiology  History and Physical 4/23/2025     Lynn Romagnano   1960   [id number]    Assessment/Plan:  65 yo female with proteinuria presenting for renal core biopsy    The procedure and risks were discussed with the patient.  All questions were answered. Informed consent was obtained.      Problem List Items Addressed This Visit          Musculoskeletal and Integument    Rheumatoid arthritis involving multiple sites with positive rheumatoid factor (HCC)    Relevant Orders    IR biopsy kidney random          Subjective:     Patient ID: Lynn Romagnano is a 64 y.o. female.    History of Present Illness  65 yo female with proteinuria presenting for renal core biopsy    Review of Systems      Past Medical History:   Diagnosis Date    Aortic aneurysm (HCC)     Chronic kidney disease     COPD (chronic obstructive pulmonary disease) (HCC)     Ectopic pregnancy     Enlarged pituitary gland (HCC)     Hypertension     RA (rheumatoid arthritis) (HCC)         Past Surgical History:   Procedure Laterality Date    CARDIAC SURGERY          Social History     Tobacco Use   Smoking Status Every Day    Current packs/day: 0.25    Types: Cigarettes   Smokeless Tobacco Never        Social History     Substance and Sexual Activity   Alcohol Use Not Currently        Social History     Substance and Sexual Activity   Drug Use No        Allergies   Allergen Reactions    Penicillins Hives       Current Outpatient Medications   Medication Sig Dispense Refill    atorvastatin (LIPITOR) 10 mg tablet Take 40 mg by mouth daily       dapagliflozin (Farxiga) 10 MG tablet Take 10 mg by mouth daily      ergocalciferol (ERGOCALCIFEROL) 1.25 MG (19285 UT) capsule Take 50,000 Units by mouth once a week      hydroxychloroquine (PLAQUENIL) 200 mg tablet Take 200 mg by mouth daily      lisinopril (ZESTRIL) 20 mg tablet Take 20 mg by mouth daily      nortriptyline (PAMELOR) 10 mg capsule 1 pill p.o. at bedtime then every 10 days can  increase dose by 1 pill to initial maximum of 7 pills at bedtime      albuterol (2.5 mg/3 mL) 0.083 % nebulizer solution Take 2.5 mg by nebulization every 6 (six) hours as needed for wheezing or shortness of breath      albuterol (PROVENTIL HFA,VENTOLIN HFA) 90 mcg/act inhaler Inhale 2 puffs every 6 (six) hours as needed for wheezing      aspirin (ECOTRIN) 325 mg EC tablet Take 1 tablet (325 mg total) by mouth daily for 30 doses 30 tablet 0    budesonide (PULMICORT) 0.25 mg/2 mL nebulizer solution Take 0.25 mg by nebulization 2 (two) times a day Rinse mouth after use.      buPROPion (WELLBUTRIN XL) 150 mg 24 hr tablet Take 150 mg by mouth daily      clopidogrel (PLAVIX) 75 mg tablet Take 75 mg by mouth daily      ferrous sulfate 325 (65 Fe) mg tablet Take 325 mg by mouth daily with breakfast      fluticasone-salmeterol (ADVAIR, WIXELA) 250-50 mcg/dose inhaler Inhale 1 puff 2 (two) times a day Rinse mouth after use.      furosemide (LASIX) 20 mg tablet Take 20 mg by mouth daily      guaiFENesin (MUCINEX) 600 mg 12 hr tablet Take 1 tablet (600 mg total) by mouth every 12 (twelve) hours 10 tablet 0    ibuprofen (MOTRIN) 600 mg tablet Take 600 mg by mouth every 8 (eight) hours      metoprolol succinate (TOPROL-XL) 25 mg 24 hr tablet Take 25 mg by mouth daily      nicotine (NICODERM CQ) 21 mg/24 hr TD 24 hr patch Place 1 patch on the skin daily 28 patch 0    Orencia ClickJect 125 MG/ML SOAJ Inject 125 mg under the skin      potassium chloride (MICRO-K) 10 MEQ CR capsule Take 20 mEq by mouth daily       predniSONE 10 mg tablet 40 mg for 2 days, 30 mg for 2 days, 20 mg for 2 days, 10 mg for 2 days 20 tablet 0    tiotropium (SPIRIVA) 18 mcg inhalation capsule Place 18 mcg into inhaler and inhale daily       No current facility-administered medications for this encounter.          Objective:    Vitals:    04/23/25 0923 04/23/25 0927   BP: 145/71    Pulse: 91    Resp: 16    Temp: 97.9 °F (36.6 °C)    TempSrc: Temporal   "  SpO2:  99%   Weight: 47.2 kg (104 lb)    Height: 5' 3\" (1.6 m)         Physical Exam  Cardiovascular:      Rate and Rhythm: Normal rate and regular rhythm.      Heart sounds: Normal heart sounds.   Pulmonary:      Effort: Pulmonary effort is normal.      Breath sounds: Normal breath sounds.   Abdominal:      Palpations: Abdomen is soft.         No results found for: \"BNP\"   Lab Results   Component Value Date    WBC 8.03 04/23/2025    HGB 11.5 04/23/2025    HCT 38.2 04/23/2025    MCV 82 04/23/2025     04/23/2025     Lab Results   Component Value Date    INR 1.10 02/22/2020    INR 1.2 02/07/2020    INR 1.05 04/30/2018    PROTIME 14.2 02/22/2020    PROTIME 13.9 04/30/2018     Lab Results   Component Value Date    PTT 35 02/22/2020         I have personally reviewed pertinent imaging and laboratory results.     Code Status: Prior  Advance Directive and Living Will:      Power of :    POLST:      This text is generated with voice recognition software. There may be translation, syntax,  or grammatical errors. If you have any questions, please contact the dictating provider.   "

## 2025-04-23 NOTE — BRIEF OP NOTE (RAD/CATH)
INTERVENTIONAL RADIOLOGY PROCEDURE NOTE    Date: 4/23/2025    Procedure: Image guided random renal core biopsy  Procedure Summary       Date: 04/23/25 Room / Location: [facility] CT Scan    Anesthesia Start:  Anesthesia Stop:     Procedure: IR BIOPSY KIDNEY RANDOM Diagnosis:       Rheumatoid arthritis involving multiple sites with positive rheumatoid factor (HCC)      (proteinuria)    Scheduled Providers:  Responsible Provider:     Anesthesia Type: Not recorded ASA Status: Not recorded            Preoperative diagnosis:   1. Rheumatoid arthritis involving multiple sites with positive rheumatoid factor (HCC)         Postoperative diagnosis: Same.    Surgeon: Filiberto Sebastian DO     Assistant: None. No qualified resident was available.    Blood loss: minimal    Specimens: 3 18 gauge core     Findings: glomeruli seen    Complications: None immediate.    Anesthesia: conscious sedation and local

## 2025-06-02 LAB
ALBUMIN MFR UR ELPH: 53.4 %
ALPHA1 GLOB MFR UR ELPH: 4.1 %
ALPHA2 GLOB MFR UR ELPH: 10.9 %
B-GLOBULIN MFR UR ELPH: 14.3 %
C-ANCA TITR SER IF: NORMAL TITER
CRYOGLOB SER QL 1D COLD INC: NORMAL
GAMMA GLOB MFR UR ELPH: 17.4 %
HCV AB S/CO SERPL IA: REACTIVE
KAPPA LC FREE SER-MCNC: 48 MG/L (ref 3.3–19.4)
KAPPA LC FREE/LAMBDA FREE SER: 1.5 {RATIO} (ref 0.26–1.65)
LABORATORY COMMENT REPORT: NORMAL
LAMBDA LC FREE SERPL-MCNC: 32.1 MG/L (ref 5.7–26.3)
M PROTEIN MFR UR ELPH: NORMAL %
MYELOPEROXIDASE AB SER IA-ACNC: <0.2 UNITS (ref 0–0.9)
P-ANCA ATYPICAL TITR SER IF: NORMAL TITER
P-ANCA TITR SER IF: NORMAL TITER
PROT UR-MCNC: 87.4 MG/DL
PROTEINASE3 AB SER IA-ACNC: <0.2 UNITS (ref 0–0.9)
RHEUMATOID FACT SERPL-ACNC: <10 IU/ML

## 2025-06-25 DIAGNOSIS — R80.1 PERSISTENT PROTEINURIA: Primary | ICD-10-CM

## 2025-06-25 RX ORDER — DAPAGLIFLOZIN 10 MG/1
10 TABLET, FILM COATED ORAL DAILY
Qty: 90 TABLET | Refills: 3 | Status: SHIPPED | OUTPATIENT
Start: 2025-06-25

## 2025-06-25 NOTE — TELEPHONE ENCOUNTER
Reason for call:   [x] Refill   [] Prior Auth  [] Other:     Office:   [] PCP/Provider -   [x] Specialty/Provider - neph    Medication: dapagliflozin (Farxiga) 10 MG tablet Take 10 mg by mouth daily       Pharmacy: [facility] [id number] [facility]   Does the patient have enough for 3 days?   [x] Yes   [] No - Send as HP to POD    Mail Away Pharmacy   Does the patient have enough for 10 days?   [] Yes   [] No - Send as HP to POD

## 2025-07-03 ENCOUNTER — TELEPHONE (OUTPATIENT)
Dept: NEPHROLOGY | Facility: CLINIC | Age: 65
End: 2025-07-03

## 2025-07-03 NOTE — TELEPHONE ENCOUNTER
Called OM to remind patient to get lab done week prior to 07/10/25 scheduled follow-up appointment with .

## 2025-07-09 ENCOUNTER — TELEPHONE (OUTPATIENT)
Dept: NEPHROLOGY | Facility: CLINIC | Age: 65
End: 2025-07-09

## 2025-07-09 NOTE — TELEPHONE ENCOUNTER
I called and left message on patients answering machine reminding patient to have non fasting lab work done prior to appointment for Thursday 07/10/2025 with Dr. Staton

## 2025-07-10 ENCOUNTER — OFFICE VISIT (OUTPATIENT)
Dept: NEPHROLOGY | Facility: CLINIC | Age: 65
End: 2025-07-10
Payer: MEDICARE

## 2025-07-10 VITALS
HEART RATE: 75 BPM | SYSTOLIC BLOOD PRESSURE: 110 MMHG | RESPIRATION RATE: 18 BRPM | WEIGHT: 103 LBS | OXYGEN SATURATION: 98 % | TEMPERATURE: 97.4 F | DIASTOLIC BLOOD PRESSURE: 66 MMHG | HEIGHT: 63 IN | BODY MASS INDEX: 18.25 KG/M2

## 2025-07-10 DIAGNOSIS — I10 HYPERTENSION, ESSENTIAL: ICD-10-CM

## 2025-07-10 DIAGNOSIS — R80.1 PERSISTENT PROTEINURIA: ICD-10-CM

## 2025-07-10 DIAGNOSIS — R76.8 HEPATITIS C ANTIBODY DETECTED: Primary | ICD-10-CM

## 2025-07-10 DIAGNOSIS — N26.9: ICD-10-CM

## 2025-07-10 DIAGNOSIS — F17.210 CIGARETTE SMOKER: ICD-10-CM

## 2025-07-10 PROCEDURE — 99214 OFFICE O/P EST MOD 30 MIN: CPT | Performed by: INTERNAL MEDICINE

## 2025-07-10 PROCEDURE — G2211 COMPLEX E/M VISIT ADD ON: HCPCS | Performed by: INTERNAL MEDICINE

## 2025-07-10 NOTE — PROGRESS NOTES
Name: Lynn Romagnano      : 1960      MRN: [id number]  Encounter Provider: Carroll Godfrey MD  Encounter Date: 7/10/2025   Encounter department: [facility]  :  Assessment & Plan  Idiopathic nodular glomerulosclerosis  Noted on renal biopsy to have nodular glomerulosclerosis with likely etiology being tobacco smoking.  Prior renal biopsy at outside facility had yielded similar diagnosis.  Instructed on cessation of smoking.       Hepatitis C antibody detected    Orders:    Hepatitis C RNA, Quantitative PCR; Future  Patient with prior history of positive hepatitis C antibody positive as well with quantitative PCR negative in the year .  Hypertension, essential  Blood pressure is within excellent range while on lisinopril.       Cigarette smoker  Patient continues smoke cigarettes.  Recommend cessation of smoking especially in light of renal biopsy findings.       Persistent proteinuria  Most recent urine albumin creatinine ratio in 2025 had revealed approximately 659 mg of protein though improved from prior. patient remains on Farxiga and lisinopril as antiproteinuric agents.           History of Present Illness   HPI  Lynn Romagnano is a 64 y.o. female who presents to renal office for management of proteinuria that has been present for past few years.  Patient underwent renal biopsy few months ago which revealed presence of nodular glomerulosclerosis emanating from tobacco smoking.  Patient apparently had renal biopsy last year already which had revealed similar diagnosis.  Patient however does not seem to remember she had a biopsy earlier in her lifetime.  Reports fewer bubbles in the commode suggestive of improvement in proteinuria.  Continues to smoke cigarettes.  History obtained from: patient    Review of Systems   Constitutional: Negative.    HENT: Negative.     Eyes: Negative.    Respiratory: Negative.     Cardiovascular: Negative.    Gastrointestinal:  "Negative.    Endocrine: Negative.    Genitourinary: Negative.    Musculoskeletal: Negative.    Skin: Negative.    Allergic/Immunologic: Negative.    Neurological: Negative.    Hematological: Negative.    All other systems reviewed and are negative.    Medical History Reviewed by provider this encounter:     .     Objective   /66 (Patient Position: Sitting, Cuff Size: Large)   Pulse 75   Temp (!) 97.4 °F (36.3 °C) (Temporal)   Resp 18   Ht 5' 3\" (1.6 m)   Wt 46.7 kg (103 lb)   SpO2 98%   BMI 18.25 kg/m²      Physical Exam  Constitutional:       Appearance: She is well-developed.   HENT:      Head: Normocephalic and atraumatic.     Eyes:      Pupils: Pupils are equal, round, and reactive to light.       Cardiovascular:      Rate and Rhythm: Normal rate and regular rhythm.      Heart sounds: Normal heart sounds.   Pulmonary:      Effort: Pulmonary effort is normal.   Abdominal:      General: Bowel sounds are normal.      Palpations: Abdomen is soft.     Musculoskeletal:         General: Normal range of motion.      Cervical back: Neck supple.     Skin:     General: Skin is warm.     Neurological:      Mental Status: She is alert and oriented to person, place, and time.         Administrative Statements   I have spent a total time of 32 minutes in caring for this patient on the day of the visit/encounter including Diagnostic results, Prognosis, Risks and benefits of tx options, Instructions for management, Importance of tx compliance, Risk factor reductions, Impressions, Counseling / Coordination of care, and Documenting in the medical record.  "

## 2025-07-10 NOTE — ASSESSMENT & PLAN NOTE
Noted on renal biopsy to have nodular glomerulosclerosis with likely etiology being tobacco smoking.  Prior renal biopsy at outside facility had yielded similar diagnosis.  Instructed on cessation of smoking.

## 2025-07-10 NOTE — ASSESSMENT & PLAN NOTE
Most recent urine albumin creatinine ratio in March 2025 had revealed approximately 659 mg of protein though improved from prior. patient remains on Farxiga and lisinopril as antiproteinuric agents.

## 2025-07-10 NOTE — ASSESSMENT & PLAN NOTE
Patient continues smoke cigarettes.  Recommend cessation of smoking especially in light of renal biopsy findings.